# Patient Record
Sex: FEMALE | Race: AMERICAN INDIAN OR ALASKA NATIVE
[De-identification: names, ages, dates, MRNs, and addresses within clinical notes are randomized per-mention and may not be internally consistent; named-entity substitution may affect disease eponyms.]

---

## 2018-09-11 ENCOUNTER — HOSPITAL ENCOUNTER (EMERGENCY)
Dept: HOSPITAL 5 - ED | Age: 83
Discharge: HOME | End: 2018-09-11
Payer: MEDICARE

## 2018-09-11 VITALS — DIASTOLIC BLOOD PRESSURE: 49 MMHG | SYSTOLIC BLOOD PRESSURE: 156 MMHG

## 2018-09-11 DIAGNOSIS — I10: ICD-10-CM

## 2018-09-11 DIAGNOSIS — L29.9: Primary | ICD-10-CM

## 2018-09-11 PROCEDURE — 99282 EMERGENCY DEPT VISIT SF MDM: CPT

## 2018-09-11 NOTE — EMERGENCY DEPARTMENT REPORT
ED Recheck HPI





- General


Chief Complaint: Skin/Abscess/Foreign Body


Stated Complaint: CYST ON ABDOMEN


Time Seen by Provider: 09/11/18 14:27


Source: patient


Mode of arrival: Ambulatory


Limitations: No Limitations





- History of Present Illness


Initial Comments: 





This is a 83-year-old -American female who presents for wound 

reevaluation from I&D 3 days ago.  Patient states feel much better.  There is 

moderate amount of drainage.  Patient's family states they are changing 

dressing twice a day.  Patient states she has a follow-up appointment with her 

primary care provider next Thursday.  Patient states there is itching to the 

area and she has taken Benadryl with no improvement of symptoms.  She is also 

requesting more pain ibuprofen for pain management.  Denies warmth, redness, 

numbness or tingling, and odor.


MD Complaint: wound re-check


Onset/Timing: 3


-: days(s)


Initial Visit For: abscess


Returns Today for: wound recheck


Symptoms Since Prior Visit: no new symptoms


Context: planned re-check


Associated Symptoms: none


Treatments Prior to Arrival: dressings, Given Antibiotics on





- Related Data


 Previous Rx's











 Medication  Instructions  Recorded  Last Taken  Type


 


Ibuprofen [Motrin 800 MG tab] 800 mg PO Q8HR PRN #15 tablet 09/08/18 Unknown Rx


 


Sulfamethoxazole/Trimethoprim 1 each PO BID #20 tablet 09/08/18 Unknown Rx





[Bactrim DS TAB]    


 


Ibuprofen [Motrin 800 MG tab] 800 mg PO Q8HR PRN #8 tablet 09/11/18 Unknown Rx


 


hydrOXYzine PAMOATE [Vistaril] 25 mg PO Q6HR PRN #12 capsule 09/11/18 Unknown Rx











 Allergies











Allergy/AdvReac Type Severity Reaction Status Date / Time


 


Penicillins Allergy  Rash Verified 09/08/18 12:55














ED Review of Systems


ROS: 


Stated complaint: CYST ON ABDOMEN


Other details as noted in HPI





Constitutional: denies: chills, fever


Respiratory: denies: cough, shortness of breath, wheezing


Cardiovascular: denies: chest pain, palpitations


Gastrointestinal: denies: abdominal pain, nausea, diarrhea


Skin: lesions (wound with packing to pubic region).  denies: rash


Neurological: denies: headache, weakness, paresthesias


Psychiatric: denies: anxiety, depression





ED Past Medical Hx





- Past Medical History


Previous Medical History?: Yes


Hx Hypertension: Yes


Additional medical history: A FIB





- Surgical History


Past Surgical History?: Yes


Additional Surgical History: LEG SURGERY CAUTERY TO VESSELS FOR A FIB





- Social History


Smoking Status: Never Smoker


Substance Use Type: None





- Medications


Home Medications: 


 Home Medications











 Medication  Instructions  Recorded  Confirmed  Last Taken  Type


 


Ibuprofen [Motrin 800 MG tab] 800 mg PO Q8HR PRN #15 tablet 09/08/18  Unknown Rx


 


Sulfamethoxazole/Trimethoprim 1 each PO BID #20 tablet 09/08/18  Unknown Rx





[Bactrim DS TAB]     


 


Ibuprofen [Motrin 800 MG tab] 800 mg PO Q8HR PRN #8 tablet 09/11/18  Unknown Rx


 


hydrOXYzine PAMOATE [Vistaril] 25 mg PO Q6HR PRN #12 capsule 09/11/18  Unknown 

Rx














ED Physical Exam





- General


Limitations: No Limitations


General appearance: alert, in no apparent distress





- Respiratory


Respiratory exam: Present: normal lung sounds bilaterally.  Absent: respiratory 

distress





- Cardiovascular


Cardiovascular Exam: Present: regular rate, normal rhythm.  Absent: systolic 

murmur, diastolic murmur, rubs, gallop





- GI/Abdominal


GI/Abdominal exam: Present: soft, normal bowel sounds





- Neurological Exam


Neurological exam: Present: alert, oriented X3





- Psychiatric


Psychiatric exam: Present: normal affect, normal mood





- Skin


Skin exam: Present: warm, dry, normal color.  Absent: intact (1 cm wound with 

packing to right side of mons pubis, malodorous discharge, nontender), rash





ED Course


 Vital Signs











  09/11/18





  13:30


 


Temperature 98.7 F


 


Pulse Rate 56 L


 


Respiratory 18





Rate 


 


Blood Pressure 156/49


 


O2 Sat by Pulse 99





Oximetry 














ED Recheck MDM





- Differential Diagnosis


Wound Recheck





- Medical Decision Making





Patient is stable and examined by me.  Patient return for packing removal and 

reevaluation of wound.


There is moderate drainage, packing removed with worse and repacked with 

iodoform packing.


Educated patient and family on follow up plan to have packing removed and wound 

reassessed in 2-3 days. 


Patient given antibiotics and initial visit and advised to completely 

prescribed.


Start Vistaril for pruritus and ibuprofen for pain.


Patient agrees to ED plan of care. 


Discharged home and follow up with PCP in 2-3 days.


Critical care attestation.: 


If time is entered above; I have spent that time in minutes in the direct care 

of this critically ill patient, excluding procedure time.








ED Disposition


Clinical Impression: 


 Abscess packing removal





Disposition: DC-01 TO HOME OR SELFCARE


Is pt being admited?: No


Does the pt Need Aspirin: No


Condition: Stable


Instructions:  Acute Wound Care (ED)


Additional Instructions: 


Keep packing in place for 2-3 days. 


Return to ER or f/u with PCP to have packing removed and wound reassessed.


Complete full round of bactrim DS antibiotic as prescribed on initial visit.


Follow up with PCP or ER in 2-3 days.


Return to ER if foul smelling discharge, swelling, or severe pain to wound.


Prescriptions: 


hydrOXYzine PAMOATE [Vistaril] 25 mg PO Q6HR PRN #12 capsule


 PRN Reason: Itching


Ibuprofen [Motrin 800 MG tab] 800 mg PO Q8HR PRN #8 tablet


 PRN Reason: Pain , Severe (7-10)


Referrals: 


JESIKA ANDREA MD [Referring] - 3-5 Days


Time of Disposition: 15:06


Print Language: ENGLISH

## 2018-09-14 ENCOUNTER — HOSPITAL ENCOUNTER (INPATIENT)
Dept: HOSPITAL 5 - ED | Age: 83
LOS: 3 days | Discharge: HOME HEALTH | DRG: 871 | End: 2018-09-17
Attending: INTERNAL MEDICINE | Admitting: INTERNAL MEDICINE
Payer: MEDICARE

## 2018-09-14 DIAGNOSIS — E87.5: ICD-10-CM

## 2018-09-14 DIAGNOSIS — N18.9: ICD-10-CM

## 2018-09-14 DIAGNOSIS — E87.1: ICD-10-CM

## 2018-09-14 DIAGNOSIS — A41.9: Primary | ICD-10-CM

## 2018-09-14 DIAGNOSIS — I50.43: ICD-10-CM

## 2018-09-14 DIAGNOSIS — L02.214: ICD-10-CM

## 2018-09-14 DIAGNOSIS — E11.22: ICD-10-CM

## 2018-09-14 DIAGNOSIS — N39.0: ICD-10-CM

## 2018-09-14 DIAGNOSIS — I48.91: ICD-10-CM

## 2018-09-14 DIAGNOSIS — Z88.0: ICD-10-CM

## 2018-09-14 DIAGNOSIS — N17.9: ICD-10-CM

## 2018-09-14 DIAGNOSIS — E86.9: ICD-10-CM

## 2018-09-14 DIAGNOSIS — Z79.899: ICD-10-CM

## 2018-09-14 DIAGNOSIS — M10.9: ICD-10-CM

## 2018-09-14 DIAGNOSIS — I13.0: ICD-10-CM

## 2018-09-14 LAB
BASOPHILS # (AUTO): 0 K/MM3 (ref 0–0.1)
BASOPHILS NFR BLD AUTO: 0.3 % (ref 0–1.8)
BILIRUB UR QL STRIP: (no result)
BLOOD UR QL VISUAL: (no result)
BUN SERPL-MCNC: 21 MG/DL (ref 7–17)
BUN/CREAT SERPL: 15 %
CALCIUM SERPL-MCNC: 10.1 MG/DL (ref 8.4–10.2)
EOSINOPHIL # BLD AUTO: 0.3 K/MM3 (ref 0–0.4)
EOSINOPHIL NFR BLD AUTO: 2.3 % (ref 0–4.3)
HCT VFR BLD CALC: 32.3 % (ref 30.3–42.9)
HEMOLYSIS INDEX: 0
HGB BLD-MCNC: 10.7 GM/DL (ref 10.1–14.3)
LYMPHOCYTES # BLD AUTO: 1.5 K/MM3 (ref 1.2–5.4)
LYMPHOCYTES NFR BLD AUTO: 13.4 % (ref 13.4–35)
MCH RBC QN AUTO: 32 PG (ref 28–32)
MCHC RBC AUTO-ENTMCNC: 33 % (ref 30–34)
MCV RBC AUTO: 96 FL (ref 79–97)
MONOCYTES # (AUTO): 0.8 K/MM3 (ref 0–0.8)
MONOCYTES % (AUTO): 6.7 % (ref 0–7.3)
MUCOUS THREADS #/AREA URNS HPF: (no result) /HPF
PH UR STRIP: 5 [PH] (ref 5–7)
PLATELET # BLD: 403 K/MM3 (ref 140–440)
RBC # BLD AUTO: 3.37 M/MM3 (ref 3.65–5.03)
RBC #/AREA URNS HPF: 3 /HPF (ref 0–6)
UROBILINOGEN UR-MCNC: < 2 MG/DL (ref ?–2)
WBC #/AREA URNS HPF: 7 /HPF (ref 0–6)

## 2018-09-14 PROCEDURE — 82550 ASSAY OF CK (CPK): CPT

## 2018-09-14 PROCEDURE — A9558 XE133 XENON 10MCI: HCPCS

## 2018-09-14 PROCEDURE — 82140 ASSAY OF AMMONIA: CPT

## 2018-09-14 PROCEDURE — 96374 THER/PROPH/DIAG INJ IV PUSH: CPT

## 2018-09-14 PROCEDURE — 83036 HEMOGLOBIN GLYCOSYLATED A1C: CPT

## 2018-09-14 PROCEDURE — 93306 TTE W/DOPPLER COMPLETE: CPT

## 2018-09-14 PROCEDURE — 82962 GLUCOSE BLOOD TEST: CPT

## 2018-09-14 PROCEDURE — 71046 X-RAY EXAM CHEST 2 VIEWS: CPT

## 2018-09-14 PROCEDURE — 78582 LUNG VENTILAT&PERFUS IMAGING: CPT

## 2018-09-14 PROCEDURE — 36415 COLL VENOUS BLD VENIPUNCTURE: CPT

## 2018-09-14 PROCEDURE — 84484 ASSAY OF TROPONIN QUANT: CPT

## 2018-09-14 PROCEDURE — 93005 ELECTROCARDIOGRAM TRACING: CPT

## 2018-09-14 PROCEDURE — 80048 BASIC METABOLIC PNL TOTAL CA: CPT

## 2018-09-14 PROCEDURE — 93010 ELECTROCARDIOGRAM REPORT: CPT

## 2018-09-14 PROCEDURE — 81001 URINALYSIS AUTO W/SCOPE: CPT

## 2018-09-14 PROCEDURE — 87040 BLOOD CULTURE FOR BACTERIA: CPT

## 2018-09-14 PROCEDURE — 85379 FIBRIN DEGRADATION QUANT: CPT

## 2018-09-14 PROCEDURE — A9540 TC99M MAA: HCPCS

## 2018-09-14 PROCEDURE — 85025 COMPLETE CBC W/AUTO DIFF WBC: CPT

## 2018-09-14 PROCEDURE — 96361 HYDRATE IV INFUSION ADD-ON: CPT

## 2018-09-14 PROCEDURE — 82553 CREATINE MB FRACTION: CPT

## 2018-09-14 PROCEDURE — 80202 ASSAY OF VANCOMYCIN: CPT

## 2018-09-14 PROCEDURE — 80053 COMPREHEN METABOLIC PANEL: CPT

## 2018-09-14 PROCEDURE — 83880 ASSAY OF NATRIURETIC PEPTIDE: CPT

## 2018-09-14 RX ADMIN — PIPERACILLIN SODIUM AND TAZOBACTAM SODIUM SCH MLS/HR: 3; .375 INJECTION, POWDER, LYOPHILIZED, FOR SOLUTION INTRAVENOUS at 22:05

## 2018-09-14 RX ADMIN — HYDROXYZINE PAMOATE PRN MG: 25 CAPSULE ORAL at 23:58

## 2018-09-14 RX ADMIN — Medication SCH: at 22:50

## 2018-09-14 RX ADMIN — METOPROLOL TARTRATE SCH MG: 50 TABLET, FILM COATED ORAL at 20:13

## 2018-09-14 RX ADMIN — ALLOPURINOL SCH MG: 100 TABLET ORAL at 22:59

## 2018-09-14 RX ADMIN — FAMOTIDINE SCH MG: 20 TABLET ORAL at 22:59

## 2018-09-14 NOTE — XRAY REPORT
CHEST 2 VIEWS



INDICATION: Shortness of breath.



COMPARISON: None similar at this institution.



FINDINGS: PA and lateral chest radiographs suggest top normal heart 

size.  Normal mediastinal and hilar contours.  No effusions or CHF.  

Demineralized bones with mild spinal degenerative spurring. 



CONCLUSION: No acute chest process, as described.



Thank you for the opportunity to participate in this patient's care.

## 2018-09-14 NOTE — EMERGENCY DEPARTMENT REPORT
ED Shortness of Breath HPI





- General


Chief Complaint: Dyspnea/Respdistress


Stated Complaint: REPACK PACKING


Time Seen by Provider: 09/14/18 13:33


Source: patient, family


Mode of arrival: Wheelchair


Limitations: No Limitations





- History of Present Illness


Initial Comments: 


Patient is an 83-year-old female presents to emergency room that presents 

emergency room for dizziness and shortness of breath that started this morning.

  Patient states 4 days ago she had an I&D done on her lower abdomen.  Patient 

states that the pain around the abdomen is increasing and it has become more 

hard and more discharge.  Patient states that the site is worsening.  Patient 

has come here to have the packing removed and changed.  Patient denies fever 

chills.  Patient denies bodyaches.  Patient states she is just not feeling 

well.  Patient states she feels weak.





MD Complaint: shortness of breath


-: Sudden


Severity: severe


Consistency: constant


Improves With: rest


Worsens With: exertion, movement


Context: recent illness


Associated Symptoms: denies other symptoms


Treatments Prior to Arrival: none





- Related Data


Home Oxygen Therapy: No


 Home Medications











 Medication  Instructions  Recorded  Confirmed  Last Taken


 


Allopurinol [Zyloprim] 100 mg PO QDAY 09/14/18 09/14/18 09/14/18


 


Metoprolol Tartrate 50 mg PO Q12H 09/14/18 09/14/18 09/14/18








 Previous Rx's











 Medication  Instructions  Recorded  Last Taken  Type


 


Sulfamethoxazole/Trimethoprim 1 each PO BID #20 tablet 09/08/18 Unknown Rx





[Bactrim DS TAB]    


 


Ibuprofen [Motrin 800 MG tab] 800 mg PO Q8HR PRN #8 tablet 09/11/18 Unknown Rx


 


hydrOXYzine PAMOATE [Vistaril] 25 mg PO Q6HR PRN #12 capsule 09/11/18 Unknown Rx











 Allergies











Allergy/AdvReac Type Severity Reaction Status Date / Time


 


Penicillins Allergy  Rash Verified 09/08/18 12:55














ED Review of Systems


ROS: 


Stated complaint: REPACK PACKING


Other details as noted in HPI





Constitutional: malaise, weakness.  denies: chills, fever


Eyes: denies: eye pain, eye discharge, vision change


ENT: denies: ear pain, throat pain


Respiratory: shortness of breath.  denies: cough, wheezing


Cardiovascular: denies: chest pain, palpitations


Endocrine: no symptoms reported


Gastrointestinal: denies: abdominal pain, nausea, diarrhea


Genitourinary: denies: urgency, dysuria, discharge


Musculoskeletal: denies: back pain, joint swelling, arthralgia


Skin: denies: rash, lesions


Neurological: weakness, vertigo.  denies: headache, paresthesias


Psychiatric: denies: anxiety, depression


Hematological/Lymphatic: denies: easy bleeding, easy bruising





ED Past Medical Hx





- Past Medical History


Previous Medical History?: Yes


Hx Hypertension: Yes


Additional medical history: A FIB





- Surgical History


Past Surgical History?: Yes


Additional Surgical History: LEG SURGERY CAUTERY TO VESSELS FOR A FIB





- Family History


Family history: no significant





- Social History


Smoking Status: Never Smoker


Substance Use Type: None





- Medications


Home Medications: 


 Home Medications











 Medication  Instructions  Recorded  Confirmed  Last Taken  Type


 


Sulfamethoxazole/Trimethoprim 1 each PO BID #20 tablet 09/08/18 09/14/18 

Unknown Rx





[Bactrim DS TAB]     


 


Ibuprofen [Motrin 800 MG tab] 800 mg PO Q8HR PRN #8 tablet 09/11/18 09/14/18 

Unknown Rx


 


hydrOXYzine PAMOATE [Vistaril] 25 mg PO Q6HR PRN #12 capsule 09/11/18 09/14/18 

Unknown Rx


 


Allopurinol [Zyloprim] 100 mg PO QDAY 09/14/18 09/14/18 09/14/18 History


 


Metoprolol Tartrate 50 mg PO Q12H 09/14/18 09/14/18 09/14/18 History














ED Physical Exam





- General


Limitations: No Limitations


General appearance: alert, in no apparent distress





- Head


Head exam: Present: atraumatic, normocephalic





- Eye


Eye exam: Present: normal appearance





- ENT


ENT exam: Present: mucous membranes moist





- Neck


Neck exam: Present: normal inspection





- Respiratory


Respiratory exam: Present: normal lung sounds bilaterally.  Absent: respiratory 

distress





- Cardiovascular


Cardiovascular Exam: Present: regular rate, normal rhythm.  Absent: systolic 

murmur, diastolic murmur, rubs, gallop





- GI/Abdominal


GI/Abdominal exam: Present: soft, normal bowel sounds





- Extremities Exam


Extremities exam: Present: normal inspection





- Back Exam


Back exam: Present: normal inspection





- Neurological Exam


Neurological exam: Present: alert, oriented X3





- Psychiatric


Psychiatric exam: Present: normal affect, normal mood





- Skin


Skin exam: Present: warm, dry, normal color, erythema (right lower abdomen I&D 

site hard and red and indurated with a purulent discharge).  Absent: rash





ED Course


 Vital Signs











  09/14/18 09/14/18 09/14/18





  13:03 14:07 14:15


 


Temperature 98.9 F  


 


Pulse Rate 50 L  52 L


 


Respiratory 14 14 17





Rate   


 


Blood Pressure 184/67  202/79


 


O2 Sat by Pulse 100  100





Oximetry   














  09/14/18 09/14/18 09/14/18





  14:30 14:46 15:38


 


Temperature   


 


Pulse Rate 51 L 53 L 60


 


Respiratory 18 12 13





Rate   


 


Blood Pressure  183/66 183/66


 


O2 Sat by Pulse 99 100 96





Oximetry   














  09/14/18 09/14/18 09/14/18





  15:46 16:00 16:15


 


Temperature   


 


Pulse Rate 55 L 54 L 54 L


 


Respiratory 8 L 13 19





Rate   


 


Blood Pressure 191/74 174/69 171/65


 


O2 Sat by Pulse 100 100 98





Oximetry   














  09/14/18 09/14/18 09/14/18





  16:30 16:46 17:00


 


Temperature   


 


Pulse Rate 54 L 56 L 56 L


 


Respiratory 12 13 13





Rate   


 


Blood Pressure 168/61 145/71 182/60


 


O2 Sat by Pulse 99 99 100





Oximetry   














- Reevaluation(s)


Reevaluation #1: 


 Discussed all results with patient and family.  Patient agrees to plan of care 

and admission.  Patient admitted to the hospitalist service.  We'll consult 

hospitalist. 


09/14/18 15:55











- Consultations


Consultation #1: 


 Hospitalist consulted for admission.  Hospitalist to admit patient.  Dr. Ramos 

to assume care


09/14/18 15:56








ED Medical Decision Making





- Lab Data


Result diagrams: 


 09/14/18 13:23





 09/14/18 13:23





- EKG Data


-: EKG Interpreted by Me


EKG shows normal: sinus rhythm, axis, intervals, QRS complexes, ST-T waves


Rate: bradycardia





- Radiology Data


Radiology results: report reviewed, image reviewed








 CHEST 2 VIEWS 





 INDICATION: Shortness of breath. 





 COMPARISON: None similar at this institution. 





 FINDINGS: PA and lateral chest radiographs suggest top normal heart 


 size. Normal mediastinal and hilar contours. No effusions or CHF. 


 Demineralized bones with mild spinal degenerative spurring. 





 CONCLUSION: No acute chest process, as described. 





 Thank you for the opportunity to participate in this patient's care. 





 Transcribed By: RS 


 Dictated By: JOSE MANUEL WINN MD 


 Electronically Authenticated By: JOSE MANUEL WINN MD 


 Signed Date/Time: 09/14/18 1402 























VENTILATION PERFUSION SCAN 





 INDICATION: Shortness of breath. Elevated creatinine. 





 COMPARISON: None similar. 





 FINDINGS: VQ scan performed in anterior, posterior, lateral and oblique 


 projections. 5 mCi of technetium 99m MAA was used for the perfusion 


 assessment while 15 millicuries of Xenon 133 was utilized for the 


 ventilation portion of the study. 





 Ventilation images demonstrate fairly homogenous radiotracer 


 distribution throughout both lungs, though slight air trapping may be 


 present. 





 The perfusion matches the ventilation without large lobar or definite 


 segmental defects. 





 Available chest radiograph from earlier today demonstrates no acute 


 chest process. 





 CONCLUSION: Low probability exam for pulmonary embolism. 





 Thank you for the opportunity to participate in this patient's care. 





 Transcribed By: RS 


 Dictated By: JOSE MANUEL WINN MD 


 Electronically Authenticated By: JOSE MANUEL WINN MD 


 Signed Date/Time: 09/14/18 1528 























- Medical Decision Making


Is a 83-year-old female presents to [facility] ([facility] with shortness of breath.  Patient 

found to have an elevated BNP and d-dimer.  Nuclear scan was negative chest x-

rays negative.  Patient also had an elevated white count and a UTI.  Patient 

was admitted to the hospitalist service for further evaluation and treatment.








- Differential Diagnosis


sepsis. sob. pna. uti, abscess


Critical Care Time: Yes


Critical care attestation.: 


If time is entered above; I have spent that time in minutes in the direct care 

of this critically ill patient, excluding procedure time.





Critical Care Time: 


35 minutes for cc time








ED Disposition


Clinical Impression: 


 Abscess, SOB (shortness of breath) on exertion, SOB (shortness of breath), 

Dizziness, New onset of congestive heart failure, Elevated d-dimer, Elevated 

brain natriuretic peptide (BNP) level, Hyperkalemia, Acute kidney insufficiency





UTI (urinary tract infection)


Qualifiers:


 Urinary tract infection type: acute cystitis Hematuria presence: with 

hematuria Qualified Code(s): N30.01 - Acute cystitis with hematuria





Disposition: DC-09 OP ADMIT IP TO THIS HOSP


Is pt being admited?: Yes


Does the pt Need Aspirin: No


Condition: Critical


Time of Disposition: 15:55

## 2018-09-14 NOTE — NUCLEAR MEDICINE REPORT
VENTILATION PERFUSION SCAN



INDICATION: Shortness of breath.  Elevated creatinine.



COMPARISON: None similar.



FINDINGS: VQ scan performed in anterior, posterior, lateral and oblique 

projections. 5 mCi of technetium 99m MAA was used for the perfusion 

assessment while 15 millicuries of Xenon 133 was utilized for the 

ventilation portion of the study.



Ventilation images demonstrate fairly homogenous radiotracer 

distribution throughout both lungs, though slight air trapping may be 

present.



The perfusion matches the ventilation without large lobar or definite 

segmental defects. 



Available chest radiograph from earlier today demonstrates no acute 

chest process.



CONCLUSION: Low probability exam for pulmonary embolism.



Thank you for the opportunity to participate in this patient's care.

## 2018-09-15 LAB
ALBUMIN SERPL-MCNC: 3.2 G/DL (ref 3.9–5)
ALT SERPL-CCNC: 10 UNITS/L (ref 7–56)
BASOPHILS # (AUTO): 0 K/MM3 (ref 0–0.1)
BASOPHILS NFR BLD AUTO: 0.2 % (ref 0–1.8)
BUN SERPL-MCNC: 18 MG/DL (ref 7–17)
BUN/CREAT SERPL: 14 %
CALCIUM SERPL-MCNC: 10 MG/DL (ref 8.4–10.2)
EOSINOPHIL # BLD AUTO: 0.2 K/MM3 (ref 0–0.4)
EOSINOPHIL NFR BLD AUTO: 2.2 % (ref 0–4.3)
HCT VFR BLD CALC: 29.2 % (ref 30.3–42.9)
HEMOLYSIS INDEX: 7
HGB BLD-MCNC: 9.9 GM/DL (ref 10.1–14.3)
LYMPHOCYTES # BLD AUTO: 2.1 K/MM3 (ref 1.2–5.4)
LYMPHOCYTES NFR BLD AUTO: 19.7 % (ref 13.4–35)
MCH RBC QN AUTO: 32 PG (ref 28–32)
MCHC RBC AUTO-ENTMCNC: 34 % (ref 30–34)
MCV RBC AUTO: 94 FL (ref 79–97)
MONOCYTES # (AUTO): 0.8 K/MM3 (ref 0–0.8)
MONOCYTES % (AUTO): 7.4 % (ref 0–7.3)
PLATELET # BLD: 362 K/MM3 (ref 140–440)
RBC # BLD AUTO: 3.11 M/MM3 (ref 3.65–5.03)

## 2018-09-15 RX ADMIN — VANCOMYCIN HYDROCHLORIDE SCH MLS/HR: 5 INJECTION, POWDER, LYOPHILIZED, FOR SOLUTION INTRAVENOUS at 12:14

## 2018-09-15 RX ADMIN — HYDROXYZINE PAMOATE PRN MG: 25 CAPSULE ORAL at 06:30

## 2018-09-15 RX ADMIN — ALLOPURINOL SCH MG: 100 TABLET ORAL at 11:23

## 2018-09-15 RX ADMIN — FAMOTIDINE SCH MG: 20 TABLET ORAL at 09:18

## 2018-09-15 RX ADMIN — HYDRALAZINE HYDROCHLORIDE PRN MG: 20 INJECTION INTRAMUSCULAR; INTRAVENOUS at 02:40

## 2018-09-15 RX ADMIN — Medication SCH ML: at 22:47

## 2018-09-15 RX ADMIN — AMLODIPINE BESYLATE SCH: 10 TABLET ORAL at 11:24

## 2018-09-15 RX ADMIN — HYDRALAZINE HYDROCHLORIDE PRN MG: 20 INJECTION INTRAMUSCULAR; INTRAVENOUS at 19:58

## 2018-09-15 RX ADMIN — GLIMEPIRIDE SCH MG: 2 TABLET ORAL at 09:18

## 2018-09-15 RX ADMIN — METOPROLOL TARTRATE SCH: 50 TABLET, FILM COATED ORAL at 08:31

## 2018-09-15 RX ADMIN — OXYCODONE AND ACETAMINOPHEN PRN TAB: 5; 325 TABLET ORAL at 04:40

## 2018-09-15 RX ADMIN — PIPERACILLIN SODIUM AND TAZOBACTAM SODIUM SCH: 3; .375 INJECTION, POWDER, LYOPHILIZED, FOR SOLUTION INTRAVENOUS at 00:04

## 2018-09-15 RX ADMIN — Medication SCH ML: at 11:24

## 2018-09-15 RX ADMIN — MEROPENEM SCH MLS/HR: 500 INJECTION, POWDER, FOR SOLUTION INTRAVENOUS at 22:45

## 2018-09-15 RX ADMIN — OXYCODONE AND ACETAMINOPHEN PRN TAB: 5; 325 TABLET ORAL at 19:58

## 2018-09-15 RX ADMIN — AMLODIPINE BESYLATE SCH: 10 TABLET ORAL at 04:38

## 2018-09-15 RX ADMIN — METOPROLOL TARTRATE SCH MG: 50 TABLET, FILM COATED ORAL at 22:46

## 2018-09-15 RX ADMIN — ENOXAPARIN SODIUM SCH MG: 100 INJECTION SUBCUTANEOUS at 09:18

## 2018-09-15 RX ADMIN — PIPERACILLIN SODIUM AND TAZOBACTAM SODIUM SCH MLS/HR: 3; .375 INJECTION, POWDER, LYOPHILIZED, FOR SOLUTION INTRAVENOUS at 05:16

## 2018-09-15 NOTE — CONSULTATION
History of Present Illness


Consult date: 09/15/18


Reason for consult: wound care


Requesting physician: ROSA LOREDO


Chief complaint: 





open wound





- History of present illness


History of present illness: 





84yo F with recent groin infection has been coming to the ED every few days 

results for care of a "boil" that was lanced in the ED last Saturday. When she 

came yesterday for a dressing change, she reported that she was feeling very 

weak. Pt was admitted for CHF exacerbation. We are being asked to see the 

patient for wound care.  Patient denies any problems with the wound.  She feels 

as though the wound is getting better.  Denies any fevers, chills, nausea, 

vomiting.





Past History


Past Medical History: atrial fib


Past Surgical History: Other (a fib ablation)


Social history: denies: smoking, alcohol abuse


Family history: no significant family history





Medications and Allergies


 Allergies











Allergy/AdvReac Type Severity Reaction Status Date / Time


 


Penicillins Allergy  Rash Verified 09/08/18 12:55











 Home Medications











 Medication  Instructions  Recorded  Confirmed  Last Taken  Type


 


Sulfamethoxazole/Trimethoprim 1 each PO BID #20 tablet 09/08/18 09/14/18 

Unknown Rx





[Bactrim DS TAB]     


 


Ibuprofen [Motrin 800 MG tab] 800 mg PO Q8HR PRN #8 tablet 09/11/18 09/14/18 

Unknown Rx


 


hydrOXYzine PAMOATE [Vistaril] 25 mg PO Q6HR PRN #12 capsule 09/11/18 09/14/18 

Unknown Rx


 


Allopurinol [Zyloprim] 100 mg PO QDAY 09/14/18 09/14/18 09/14/18 History


 


Metoprolol Tartrate 50 mg PO Q12H 09/14/18 09/14/18 09/14/18 History











Active Meds: 


Active Medications





Acetaminophen (Tylenol)  650 mg PO Q4H PRN


   PRN Reason: Pain MILD(1-3)/Fever >100.5/HA


Allopurinol (Zyloprim)  100 mg PO QDAY [facility]


   Last Admin: 09/15/18 11:23 Dose:  100 mg


Amlodipine Besylate (Norvasc)  10 mg PO QDAY [facility]


   Last Admin: 09/15/18 11:24 Dose:  Not Given


Enoxaparin Sodium (Lovenox)  30 mg SUB-Q QDAY [facility]


   Last Admin: 09/15/18 09:18 Dose:  30 mg


Famotidine (Pepcid)  20 mg PO DAILY [facility]


   Last Admin: 09/15/18 09:18 Dose:  20 mg


Glimepiride (Amaryl)  2 mg PO QDDIAB [facility]


   Last Admin: 09/15/18 09:18 Dose:  2 mg


Hydralazine HCl (Apresoline)  20 mg IV Q4H PRN


   PRN Reason: Blood Pressure


   Last Admin: 09/15/18 02:40 Dose:  20 mg


Hydroxyzine Pamoate (Vistaril)  25 mg PO Q6HR PRN


   PRN Reason: Itching


   Last Admin: 09/15/18 06:30 Dose:  25 mg


Meropenem (Merrem/Ns 500 Mg/50 Ml)  500 mg in 50 mls @ 100 mls/hr IV Q12HR [facility]


Vancomycin HCl 1,250 mg/ (Sodium Chloride)  275 mls @ 166.667 mls/hr IV Q24HR 

[facility]


   Last Admin: 09/15/18 12:14 Dose:  166.667 mls/hr


Ibuprofen (Motrin)  800 mg PO Q8HR PRN


   PRN Reason: Pain , Severe (7-10)


   Last Admin: 09/14/18 23:58 Dose:  800 mg


Insulin Human Lispro (Humalog)  0 unit SUB-Q ACHS [facility]; Protocol


   Last Admin: 09/15/18 12:14 Dose:  2 unit


Metoprolol Tartrate (Lopressor)  50 mg PO Q12H [facility]


   Last Admin: 09/15/18 08:31 Dose:  Not Given


Morphine Sulfate (Morphine)  2 mg IV Q4H PRN


   PRN Reason: Pain, Moderate (4-6)


   Last Admin: 09/15/18 13:37 Dose:  2 mg


Ondansetron HCl (Zofran)  4 mg IV Q8H PRN


   PRN Reason: Nausea And Vomiting


   Last Admin: 09/15/18 13:37 Dose:  4 mg


Oxycodone/Acetaminophen (Percocet 5/325)  1 tab PO Q6H PRN


   PRN Reason: Pain, Moderate (4-6)


   Last Admin: 09/15/18 04:40 Dose:  1 tab


Sodium Chloride (Sodium Chloride Flush Syringe 10 Ml)  10 ml IV BID [facility]


   Last Admin: 09/15/18 11:24 Dose:  10 ml


Sodium Chloride (Sodium Chloride Flush Syringe 10 Ml)  10 ml IV PRN PRN


   PRN Reason: LINE FLUSH











Review of Systems





- Constitutional


weakness, no fever, no chills, no sweats, no night sweats, no poor appetite





- Cardiovascular


no chest pain





- Respiratory


dyspnea on exertion (- better now), no cough





- Gastrointestinal


no abdominal pain, no nausea, no vomiting





- Genitourinary


Genitourinary: no dysuria





- Integumentary


wounds, boils





Exam


 Vital Signs











Temp Pulse Resp BP Pulse Ox


 


 98.9 F   50 L  14   184/67   100 


 


 09/14/18 13:03  09/14/18 13:03  09/14/18 13:03  09/14/18 13:03  09/14/18 13:03














- General physical appearance


Positive: no distress, no pain, other (pleasant elderly lady)





- Eyes


Positive: normal occular movement





- Respiratory


Positive: normal expansion, normal respiratory effort





- Abdomen


Abdomen: Present: soft.  Absent: tender, distended





- Integumentary


other (~7cm wound in right perineal/groin region. Wound is fairly clean. No 

erythema. no signs of active infection. No purulent drainage. No packing 

currently in place. )





- Neurologic


Neurologic: alert and oriented to time, place and person, motor strength and 

sensation are grossly intact





- Psychiatric


Psychiatric: appropriate mood/affect, intact judgment & insight





Results





- Labs





 09/15/18 04:16





 09/15/18 04:16


 Abnormal lab results











  09/14/18 09/14/18 09/14/18 Range/Units





  13:23 13:53 13:53 


 


RBC     (3.65-5.03)  M/mm3


 


Hgb     (10.1-14.3)  gm/dl


 


Hct     (30.3-42.9)  %


 


Mono % (Auto)     (0.0-7.3)  %


 


Seg Neutrophils %     (40.0-70.0)  %


 


D-Dimer   1680.08 H   (0-234)  ng/mlDDU


 


Sodium     (137-145)  mmol/L


 


BUN     (7-17)  mg/dL


 


Creatinine     (0.7-1.2)  mg/dL


 


Glucose     ()  mg/dL


 


POC Glucose     ()  


 


Hemoglobin A1c  9.0 H    (4-6)  %


 


CK-MB (CK-2) Rel Index    5.0 H  (0-4)  


 


Total Protein     (6.3-8.2)  g/dL


 


Albumin     (3.9-5)  g/dL


 


Vancomycin Trough     (5.0-20.0)  ug/mL














  09/15/18 09/15/18 09/15/18 Range/Units





  04:16 04:16 06:37 


 


RBC  3.11 L    (3.65-5.03)  M/mm3


 


Hgb  9.9 L    (10.1-14.3)  gm/dl


 


Hct  29.2 L    (30.3-42.9)  %


 


Mono % (Auto)  7.4 H    (0.0-7.3)  %


 


Seg Neutrophils %  70.5 H    (40.0-70.0)  %


 


D-Dimer     (0-234)  ng/mlDDU


 


Sodium   134 L   (137-145)  mmol/L


 


BUN   18 H   (7-17)  mg/dL


 


Creatinine   1.3 H   (0.7-1.2)  mg/dL


 


Glucose   192 H   ()  mg/dL


 


POC Glucose    176 H  ()  


 


Hemoglobin A1c     (4-6)  %


 


CK-MB (CK-2) Rel Index     (0-4)  


 


Total Protein   6.2 L   (6.3-8.2)  g/dL


 


Albumin   3.2 L   (3.9-5)  g/dL


 


Vancomycin Trough     (5.0-20.0)  ug/mL














  09/15/18 09/15/18 Range/Units





  11:44 13:00 


 


RBC    (3.65-5.03)  M/mm3


 


Hgb    (10.1-14.3)  gm/dl


 


Hct    (30.3-42.9)  %


 


Mono % (Auto)    (0.0-7.3)  %


 


Seg Neutrophils %    (40.0-70.0)  %


 


D-Dimer    (0-234)  ng/mlDDU


 


Sodium    (137-145)  mmol/L


 


BUN    (7-17)  mg/dL


 


Creatinine    (0.7-1.2)  mg/dL


 


Glucose    ()  mg/dL


 


POC Glucose  192 H   ()  


 


Hemoglobin A1c    (4-6)  %


 


CK-MB (CK-2) Rel Index    (0-4)  


 


Total Protein    (6.3-8.2)  g/dL


 


Albumin    (3.9-5)  g/dL


 


Vancomycin Trough   29.1 H  (5.0-20.0)  ug/mL








 Diabetes panel











  09/14/18 09/15/18 Range/Units





  13:23 04:16 


 


Sodium   134 L  (137-145)  mmol/L


 


Potassium   4.8  (3.6-5.0)  mmol/L


 


Chloride   99.6  ()  mmol/L


 


Carbon Dioxide   22  (22-30)  mmol/L


 


BUN   18 H  (7-17)  mg/dL


 


Creatinine   1.3 H  (0.7-1.2)  mg/dL


 


Glucose   192 H  ()  mg/dL


 


Hemoglobin A1c  9.0 H   (4-6)  %


 


Calcium   10.0  (8.4-10.2)  mg/dL


 


AST   10  (5-40)  units/L


 


ALT   10  (7-56)  units/L


 


Alkaline Phosphatase   49  ()  units/L


 


Total Protein   6.2 L  (6.3-8.2)  g/dL


 


Albumin   3.2 L  (3.9-5)  g/dL








 Calcium panel











  09/15/18 Range/Units





  04:16 


 


Calcium  10.0  (8.4-10.2)  mg/dL


 


Albumin  3.2 L  (3.9-5)  g/dL








 Pituitary panel











  09/15/18 Range/Units





  04:16 


 


Sodium  134 L  (137-145)  mmol/L


 


Potassium  4.8  (3.6-5.0)  mmol/L


 


Chloride  99.6  ()  mmol/L


 


Carbon Dioxide  22  (22-30)  mmol/L


 


BUN  18 H  (7-17)  mg/dL


 


Creatinine  1.3 H  (0.7-1.2)  mg/dL


 


Glucose  192 H  ()  mg/dL


 


Calcium  10.0  (8.4-10.2)  mg/dL








 Adrenal panel











  09/15/18 Range/Units





  04:16 


 


Sodium  134 L  (137-145)  mmol/L


 


Potassium  4.8  (3.6-5.0)  mmol/L


 


Chloride  99.6  ()  mmol/L


 


Carbon Dioxide  22  (22-30)  mmol/L


 


BUN  18 H  (7-17)  mg/dL


 


Creatinine  1.3 H  (0.7-1.2)  mg/dL


 


Glucose  192 H  ()  mg/dL


 


Calcium  10.0  (8.4-10.2)  mg/dL


 


Total Bilirubin  0.50  (0.1-1.2)  mg/dL


 


AST  10  (5-40)  units/L


 


ALT  10  (7-56)  units/L


 


Alkaline Phosphatase  49  ()  units/L


 


Total Protein  6.2 L  (6.3-8.2)  g/dL


 


Albumin  3.2 L  (3.9-5)  g/dL














Assessment and Plan





- Patient Problems


(1) Abscess


Current Visit: Yes   Status: Acute   


Plan to address problem: 


Pt stable. Pt does not need further surgery. Requires only wound care. I 

cleaned and packed the wound with Maxorb. No further change needed until 

Monday. 





Rec:


1) If still here on Monday, have WOCN see patient for wound care instructions 

and to set up patient in [facility]ound [facility]


2) Will ask CC to set up Home health for dressing changes


3) If patient is discharged before Monday, please give contact info for Wound 

Care Clinic. 





Please call with questions. 





time=30min

## 2018-09-15 NOTE — CONSULTATION
History of Present Illness





- Reason for Consult


Consult date: 09/15/18


acute renal failure, chronic renal failure


Requesting physician: ROSA LOREDO





- History of Present Illness





Patient is an 83-year-old female presents to emergency room that presents 

emergency room for dizziness and shortness of breath that started this morning.

  Patient states 4 days ago she had an I&D done on her lower abdomen.  Patient 

states that the pain around the abdomen is increasing and it has become more 

hard and more discharge.  Patient states that the site is worsening.  Patient 

has come here to have the packing removed and changed.  Patient denies fever 

chills.  Patient denies bodyaches.  Patient states she is just not feeling 

well.  Patient states she feels weak.





ROS: 


Stated complaint: REPACK PACKING


Other details as noted in HPI





Constitutional: malaise, weakness.  denies: chills, fever


Eyes: denies: eye pain, eye discharge, vision change


ENT: denies: ear pain, throat pain


Respiratory: shortness of breath.  denies: cough, wheezing


Cardiovascular: denies: chest pain, palpitations


Endocrine: no symptoms reported


Gastrointestinal: denies: abdominal pain, nausea, diarrhea


Genitourinary: denies: urgency, dysuria, discharge


Musculoskeletal: denies: back pain, joint swelling, arthralgia


Skin: denies: rash, lesions


Neurological: weakness, vertigo.  denies: headache, paresthesias


Psychiatric: denies: anxiety, depression


Hematological/Lymphatic: denies: easy bleeding, easy bruising





- Past Medical History


Previous Medical History?: Yes


Hx Hypertension: Yes


Additional medical history: A FIB





- Surgical History


Past Surgical History?: Yes


Additional Surgical History: LEG SURGERY CAUTERY TO VESSELS FOR A FIB





- Family History


Family history: no significant





- Social History


Smoking Status: Never Smoker


Substance Use Type: None











Medications and Allergies


 Allergies











Allergy/AdvReac Type Severity Reaction Status Date / Time


 


Penicillins Allergy  Rash Verified 09/08/18 12:55











 Home Medications











 Medication  Instructions  Recorded  Confirmed  Last Taken  Type


 


Sulfamethoxazole/Trimethoprim 1 each PO BID #20 tablet 09/08/18 09/14/18 

Unknown Rx





[Bactrim DS TAB]     


 


Ibuprofen [Motrin 800 MG tab] 800 mg PO Q8HR PRN #8 tablet 09/11/18 09/14/18 

Unknown Rx


 


hydrOXYzine PAMOATE [Vistaril] 25 mg PO Q6HR PRN #12 capsule 09/11/18 09/14/18 

Unknown Rx


 


Allopurinol [Zyloprim] 100 mg PO QDAY 09/14/18 09/14/18 09/14/18 History


 


Metoprolol Tartrate 50 mg PO Q12H 09/14/18 09/14/18 09/14/18 History











Active Meds: 


Active Medications





Acetaminophen (Tylenol)  650 mg PO Q4H PRN


   PRN Reason: Pain MILD(1-3)/Fever >100.5/HA


Allopurinol (Zyloprim)  100 mg PO QDAY [facility]


   Last Admin: 09/14/18 22:59 Dose:  100 mg


Amlodipine Besylate (Norvasc)  10 mg PO QDAY [facility]


   Last Admin: 09/15/18 04:38 Dose:  Not Given


Enoxaparin Sodium (Lovenox)  30 mg SUB-Q QDAY [facility]


   Last Admin: 09/15/18 09:18 Dose:  30 mg


Famotidine (Pepcid)  20 mg PO DAILY [facility]


   Last Admin: 09/15/18 09:18 Dose:  20 mg


Glimepiride (Amaryl)  2 mg PO QDDIAB [facility]


   Last Admin: 09/15/18 09:18 Dose:  2 mg


Hydralazine HCl (Apresoline)  20 mg IV Q4H PRN


   PRN Reason: Blood Pressure


   Last Admin: 09/15/18 02:40 Dose:  20 mg


Hydroxyzine Pamoate (Vistaril)  25 mg PO Q6HR PRN


   PRN Reason: Itching


   Last Admin: 09/15/18 06:30 Dose:  25 mg


Meropenem (Merrem/Ns 500 Mg/50 Ml)  500 mg in 50 mls @ 100 mls/hr IV Q12HR [facility]


Vancomycin HCl 1,250 mg/ (Sodium Chloride)  275 mls @ 166.667 mls/hr IV Q24HR 

[facility]


Ibuprofen (Motrin)  800 mg PO Q8HR PRN


   PRN Reason: Pain , Severe (7-10)


   Last Admin: 09/14/18 23:58 Dose:  800 mg


Insulin Human Lispro (Humalog)  0 unit SUB-Q ACHS [facility]; Protocol


   Last Admin: 09/15/18 08:31 Dose:  Not Given


Metoprolol Tartrate (Lopressor)  50 mg PO Q12H [facility]


   Last Admin: 09/15/18 08:31 Dose:  Not Given


Morphine Sulfate (Morphine)  2 mg IV Q4H PRN


   PRN Reason: Pain, Moderate (4-6)


Ondansetron HCl (Zofran)  4 mg IV Q8H PRN


   PRN Reason: Nausea And Vomiting


Oxycodone/Acetaminophen (Percocet 5/325)  1 tab PO Q6H PRN


   PRN Reason: Pain, Moderate (4-6)


   Last Admin: 09/15/18 04:40 Dose:  1 tab


Sodium Chloride (Sodium Chloride Flush Syringe 10 Ml)  10 ml IV BID [facility]


   Last Admin: 09/14/18 22:50 Dose:  Not Given


Sodium Chloride (Sodium Chloride Flush Syringe 10 Ml)  10 ml IV PRN PRN


   PRN Reason: LINE FLUSH











Exam





- Vital Signs


Vital signs: 


 Vital Signs











Temp Pulse Resp BP Pulse Ox


 


 98.9 F   50 L  14   184/67   100 


 


 09/14/18 13:03  09/14/18 13:03  09/14/18 13:03  09/14/18 13:03  09/14/18 13:03














- Physical Exam


Narrative exam: 





- General


Limitations: No Limitations


General appearance: alert, in no apparent distress





- Head


Head exam: Present: atraumatic, normocephalic





- Eye


Eye exam: Present: normal appearance





- ENT


ENT exam: Present: mucous membranes moist





- Neck


Neck exam: Present: normal inspection





- Respiratory


Respiratory exam: Present: normal lung sounds bilaterally.  Absent: respiratory 

distress





- Cardiovascular


Cardiovascular Exam: Present: regular rate, normal rhythm.  Absent: systolic 

murmur, diastolic murmur, rubs, gallop





- GI/Abdominal


GI/Abdominal exam: Present: soft, normal bowel sounds





- Extremities Exam


Extremities exam: Present: normal inspection





- Back Exam


Back exam: Present: normal inspection





- Neurological Exam


Neurological exam: Present: alert, oriented X3





- Psychiatric


Psychiatric exam: Present: normal affect, normal mood





- Skin


Skin exam: Present: warm, dry, normal color, erythema (right lower abdomen I&D 

site hard and red and indurated with a purulent discharge).  Absent: rash








Results





- Lab Results





 09/15/18 04:16





 09/15/18 04:16


 Most recent lab results











Calcium  10.0 mg/dL (8.4-10.2)   09/15/18  04:16    














Assessment and Plan





Impression:


* JOSH on ckd


* Volume depletion


* UTI


* abcess, wound infection


* sob








Plan:


* no indication for rrt


* cr is stable today


* iv abx for uti


* strict i/os


* avoid nephrotoxins


* will follow lizeth snown

## 2018-09-15 NOTE — PROGRESS NOTE
Assessment and Plan


Assessment and plan: 


83-year-old female was presented yesterday to the emergency department 

complaining of shortness of breath, increased draining of abscess from the 

groin area.  Patient has incision and drainage 5 days ago








Infected right groin abscess


- Patient was placed on IV meropenem and vancomycin


- ID consulted


- Gen. surgery consulted


JOSH


- Nephrology consulted


- Creatinine stable


New-onset diabetes mellitus


- Elevated hemoglobin A1c


- Sliding-scale insulin, Accu-Chek, adjust insulin as needed


Diastolic CHF, hypertension


- Echo was done and showed grade 2


- We'll manage hypertension


History of gout


- Continue allopurinol


DVT prophylaxis


- Lovenox


Disposition


- Continue inpatient care





History


Interval history: 





Patient was seen and about this morning, patient denied fever, chills, 

shortness of breath is getting better.





Hospitalist Physical





- Physical exam


Narrative exam: 





 Not in cardiopulmonary distress. 


 The patient appeared well nourished and normally developed.


 Vital signs as documented.


 Head exam is unremarkable.


 No scleral icterus .


 Neck is without jugular venous distension, thyromegaly, or carotid bruits. 


 Lungs are clear to auscultation.


Cardiac exam reveals regular rate and  Rhythm. First and second heart sounds 

normal. No murmurs, rubs or gallops. 


Abdominal exam reveals normal bowel sounds, no masses, no organomegaly and no 

aortic enlargement. 


Extremities are nonedematous and both femoral and pedal pulses are normal.


Right groin draining abscess


CNS: Alert and oriented 3.  No focal weakness.





- Constitutional


Vitals: 


 











Temp Pulse Resp BP Pulse Ox


 


 98.2 F   58 L  20   117/57   99 


 


 09/15/18 11:37  09/15/18 11:37  09/15/18 11:37  09/15/18 11:37  09/15/18 11:37











General appearance: Present: no acute distress, mild distress, well-nourished





Results





- Labs


CBC & Chem 7: 


 09/15/18 04:16





 09/15/18 04:16


Labs: 


 Laboratory Last Values











WBC  10.8 K/mm3 (4.5-11.0)   09/15/18  04:16    


 


RBC  3.11 M/mm3 (3.65-5.03)  L  09/15/18  04:16    


 


Hgb  9.9 gm/dl (10.1-14.3)  L  09/15/18  04:16    


 


Hct  29.2 % (30.3-42.9)  L  09/15/18  04:16    


 


MCV  94 fl (79-97)   09/15/18  04:16    


 


MCH  32 pg (28-32)   09/15/18  04:16    


 


MCHC  34 % (30-34)   09/15/18  04:16    


 


RDW  13.4 % (13.2-15.2)   09/15/18  04:16    


 


Plt Count  362 K/mm3 (140-440)   09/15/18  04:16    


 


Lymph % (Auto)  19.7 % (13.4-35.0)   09/15/18  04:16    


 


Mono % (Auto)  7.4 % (0.0-7.3)  H  09/15/18  04:16    


 


Eos % (Auto)  2.2 % (0.0-4.3)   09/15/18  04:16    


 


Baso % (Auto)  0.2 % (0.0-1.8)   09/15/18  04:16    


 


Lymph #  2.1 K/mm3 (1.2-5.4)   09/15/18  04:16    


 


Mono #  0.8 K/mm3 (0.0-0.8)   09/15/18  04:16    


 


Eos #  0.2 K/mm3 (0.0-0.4)   09/15/18  04:16    


 


Baso #  0.0 K/mm3 (0.0-0.1)   09/15/18  04:16    


 


Seg Neutrophils %  70.5 % (40.0-70.0)  H  09/15/18  04:16    


 


Seg Neutrophils #  7.6 K/mm3 (1.8-7.7)   09/15/18  04:16    


 


D-Dimer  1680.08 ng/mlDDU (0-234)  H  09/14/18  13:53    


 


Sodium  134 mmol/L (137-145)  L  09/15/18  04:16    


 


Potassium  4.8 mmol/L (3.6-5.0)   09/15/18  04:16    


 


Chloride  99.6 mmol/L ()   09/15/18  04:16    


 


Carbon Dioxide  22 mmol/L (22-30)   09/15/18  04:16    


 


Anion Gap  17 mmol/L  09/15/18  04:16    


 


BUN  18 mg/dL (7-17)  H  09/15/18  04:16    


 


Creatinine  1.3 mg/dL (0.7-1.2)  H  09/15/18  04:16    


 


Estimated GFR  48 ml/min  09/15/18  04:16    


 


BUN/Creatinine Ratio  14 %  09/15/18  04:16    


 


Glucose  192 mg/dL ()  H  09/15/18  04:16    


 


POC Glucose  176  ()  H  09/15/18  06:37    


 


Hemoglobin A1c  9.0 % (4-6)  H  09/14/18  13:23    


 


Lactic Acid  1.10 mmol/L (0.7-2.0)   09/14/18  13:35    


 


Calcium  10.0 mg/dL (8.4-10.2)   09/15/18  04:16    


 


Total Bilirubin  0.50 mg/dL (0.1-1.2)   09/15/18  04:16    


 


AST  10 units/L (5-40)   09/15/18  04:16    


 


ALT  10 units/L (7-56)   09/15/18  04:16    


 


Alkaline Phosphatase  49 units/L ()   09/15/18  04:16    


 


Total Creatine Kinase  38 units/L ()   09/14/18  13:53    


 


CK-MB (CK-2)  1.9 ng/mL (0.0-4.0)   09/14/18  13:53    


 


CK-MB (CK-2) Rel Index  5.0  (0-4)  H  09/14/18  13:53    


 


Troponin T  < 0.010 ng/mL (0.00-0.029)   09/14/18  13:53    


 


NT-Pro-B Natriuret Pep  1596 pg/mL (0-900)  H  09/14/18  13:53    


 


Total Protein  6.2 g/dL (6.3-8.2)  L  09/15/18  04:16    


 


Albumin  3.2 g/dL (3.9-5)  L  09/15/18  04:16    


 


Albumin/Globulin Ratio  1.1 %  09/15/18  04:16    


 


Urine Color  Yellow  (Yellow)   09/14/18  14:08    


 


Urine Turbidity  Clear  (Clear)   09/14/18  14:08    


 


Urine pH  5.0  (5.0-7.0)   09/14/18  14:08    


 


Ur Specific Gravity  1.014  (1.003-1.030)   09/14/18  14:08    


 


Urine Protein  30 mg/dl mg/dL (Negative)   09/14/18  14:08    


 


Urine Glucose (UA)  >=500 mg/dL (Negative)   09/14/18  14:08    


 


Urine Ketones  Neg mg/dL (Negative)   09/14/18  14:08    


 


Urine Blood  Neg  (Negative)   09/14/18  14:08    


 


Urine Nitrite  Neg  (Negative)   09/14/18  14:08    


 


Urine Bilirubin  Neg  (Negative)   09/14/18  14:08    


 


Urine Urobilinogen  < 2.0 mg/dL (<2.0)   09/14/18  14:08    


 


Ur Leukocyte Esterase  Tr  (Negative)   09/14/18  14:08    


 


Urine WBC (Auto)  7.0 /HPF (0.0-6.0)  H  09/14/18  14:08    


 


Urine RBC (Auto)  3.0 /HPF (0.0-6.0)   09/14/18  14:08    


 


U Epithel Cells (Auto)  < 1.0 /HPF (0-13.0)   09/14/18  14:08    


 


Urine Mucus  Few /HPF  09/14/18  14:08

## 2018-09-15 NOTE — HISTORY AND PHYSICAL REPORT
History of Present Illness


Date of examination: 09/14/18


Date of admission: 


09/14/18 18:57





Chief complaint: 


CC





Increasing SOB -1 day


Increased drainage from Rt Inguinal wound site





History of present illness: 


History of Present Illness:





83-year-old female presents to emergency room  for dizziness and shortness of 

breath that started this morning.  Patient states 4 days ago she had an I&D 

done on her lower abdomen.  Patient states that the pain around the abdomen is 

increasing and it has become more hard and more discharge.  Patient states that 

the site is worsening.  Patient has come here to have the packing removed and 

changed.  Patient denies fever chills.  Patient denies bodyaches.  Patient 

states she is just not feeling well.  Patient states she feels weak.


MD Complaint: shortness of breath -: Sudden Severity: severe Consistency: 

constant Improves With: rest Worsens With: exertion, movement


Context: recent illness Associated Symptoms: denies other symptoms Treatments 

Prior to Arrival: none














 





 Past Medical History


Previous Medical History?: Yes


Hx Hypertension: Yes


Additional medical history: A FIB





Surgical History


Past Surgical History?: Yes


Additional Surgical History: LEG SURGERY CAUTERY TO VESSELS FOR A FIB





Family History


Family history: no significant





Social History


Smoking Status: Never Smoker


Substance Use Type: None





Medications


Home Medications: 


 Home Medications











 Medication  Instructions  Recorded  Confirmed  Last Taken  Type


 


Sulfamethoxazole/Trimethoprim 1 each PO BID #20 tablet 09/08/18 09/14/18 

Unknown Rx





[Bactrim DS TAB]     


 


Ibuprofen [Motrin 800 MG tab] 800 mg PO Q8HR PRN #8 tablet 09/11/18 09/14/18 

Unknown Rx


 


hydrOXYzine PAMOATE [Vistaril] 25 mg PO Q6HR PRN #12 capsule 09/11/18 09/14/18 

Unknown Rx


 


Allopurinol [Zyloprim] 100 mg PO QDAY 09/14/18 09/14/18 09/14/18 History


 


Metoprolol Tartrate 50 mg PO Q12H 09/14/18 09/14/18 09/14/18 History








Review of Systems


ROS: 


Stated complaint: REPACK PACKING


Other details as noted in HPI





Constitutional: malaise, weakness.  denies: chills, fever


Eyes: denies: eye pain, eye discharge, vision change


ENT: denies: ear pain, throat pain


Respiratory: shortness of breath.  denies: cough, wheezing


Cardiovascular: denies: chest pain, palpitations


Endocrine: no symptoms reported


Gastrointestinal: denies: abdominal pain, nausea, diarrhea


Genitourinary: denies: urgency, dysuria, discharge


Musculoskeletal: denies: back pain, joint swelling, arthralgia


Skin: denies: rash, lesions


Neurological: weakness, vertigo.  denies: headache, paresthesias


Psychiatric: denies: anxiety, depression


Hematological/Lymphatic: denies: easy bleeding, easy bruising

















Medications and Allergies


 Allergies











Allergy/AdvReac Type Severity Reaction Status Date / Time


 


Penicillins Allergy  Rash Verified 09/08/18 12:55











 Home Medications











 Medication  Instructions  Recorded  Confirmed  Last Taken  Type


 


Sulfamethoxazole/Trimethoprim 1 each PO BID #20 tablet 09/08/18 09/14/18 

Unknown Rx





[Bactrim DS TAB]     


 


Ibuprofen [Motrin 800 MG tab] 800 mg PO Q8HR PRN #8 tablet 09/11/18 09/14/18 

Unknown Rx


 


hydrOXYzine PAMOATE [Vistaril] 25 mg PO Q6HR PRN #12 capsule 09/11/18 09/14/18 

Unknown Rx


 


Allopurinol [Zyloprim] 100 mg PO QDAY 09/14/18 09/14/18 09/14/18 History


 


Metoprolol Tartrate 50 mg PO Q12H 09/14/18 09/14/18 09/14/18 History











Active Meds: 


Active Medications





Acetaminophen (Tylenol)  650 mg PO Q4H PRN


   PRN Reason: Pain MILD(1-3)/Fever >100.5/HA


Allopurinol (Zyloprim)  100 mg PO QDAY [facility]


   Last Admin: 09/14/18 22:59 Dose:  100 mg


Amlodipine Besylate (Norvasc)  10 mg PO QDAY [facility]


   Last Admin: 09/15/18 04:38 Dose:  Not Given


Enoxaparin Sodium (Lovenox)  30 mg SUB-Q QDAY [facility]


Famotidine (Pepcid)  20 mg PO DAILY [facility]


   Last Admin: 09/14/18 22:59 Dose:  20 mg


Hydralazine HCl (Apresoline)  20 mg IV Q4H PRN


   PRN Reason: Blood Pressure


   Last Admin: 09/15/18 02:40 Dose:  20 mg


Hydroxyzine Pamoate (Vistaril)  25 mg PO Q6HR PRN


   PRN Reason: Itching


   Last Admin: 09/14/18 23:58 Dose:  25 mg


Vancomycin HCl (Vancomycin/Ns 1 Gm/250 Ml)  1 gm in 250 mls @ 166.667 mls/hr IV 

Q24HR [facility]


Meropenem (Merrem/Ns 500 Mg/50 Ml)  500 mg in 50 mls @ 50 mls/hr IV Q6HR [facility]


Ibuprofen (Motrin)  800 mg PO Q8HR PRN


   PRN Reason: Pain , Severe (7-10)


   Last Admin: 09/14/18 23:58 Dose:  800 mg


Metoprolol Tartrate (Lopressor)  50 mg PO Q12H [facility]


   Last Admin: 09/14/18 20:13 Dose:  50 mg


Morphine Sulfate (Morphine)  2 mg IV Q4H PRN


   PRN Reason: Pain, Moderate (4-6)


Ondansetron HCl (Zofran)  4 mg IV Q8H PRN


   PRN Reason: Nausea And Vomiting


Oxycodone/Acetaminophen (Percocet 5/325)  1 tab PO Q6H PRN


   PRN Reason: Pain, Moderate (4-6)


   Last Admin: 09/15/18 04:40 Dose:  1 tab


Sodium Chloride (Sodium Chloride Flush Syringe 10 Ml)  10 ml IV BID [facility]


   Last Admin: 09/14/18 22:50 Dose:  Not Given


Sodium Chloride (Sodium Chloride Flush Syringe 10 Ml)  10 ml IV PRN PRN


   PRN Reason: LINE FLUSH











Exam





- Constitutional


Vitals: 


 











Temp Pulse Resp BP Pulse Ox


 


 99 F   60   18   134/50   99 


 


 09/15/18 00:36  09/15/18 04:24  09/15/18 00:36  09/15/18 04:24  09/15/18 04:24











General appearance: Present: no acute distress, mild distress, well-nourished





- EENT


Eyes: Present: PERRL


ENT: hearing intact, clear oral mucosa





- Neck


Neck: Present: supple, normal ROM





- Respiratory


Respiratory effort: normal


Respiratory: bilateral: CTA, rales (scattered)





- Cardiovascular


Heart rate: 49


Rhythm: regular


Heart Sounds: Present: S1 & S2.  Absent: rub, click





- Extremities


Extremities: no ischemia, pulses intact, pulses symmetrical, No edema


Peripheral Pulses: within normal limits





- Abdominal


General gastrointestinal: Present: soft, non-tender, non-distended, normal 

bowel sounds, other (#[id number] depth wound present above Rt Groin.ome 

drainage present.)


Female genitourinary: Present: normal





- Rectal


Rectal Exam: deferred





- Integumentary


Integumentary: Present: clear, warm, dry





- Musculoskeletal


Musculoskeletal: gait normal, strength equal bilaterally





- Psychiatric


Psychiatric: appropriate mood/affect, intact judgment & insight





- Neurologic


Neurologic: CNII-XII intact, moves all extremities





- Allied Health


Allied health notes reviewed: nursing, case management





Results





- Labs


CBC & Chem 7: 


 09/14/18 13:23





 09/14/18 13:23


Labs: 


 Laboratory Last Values











WBC  11.5 K/mm3 (4.5-11.0)  H  09/14/18  13:23    


 


RBC  3.37 M/mm3 (3.65-5.03)  L  09/14/18  13:23    


 


Hgb  10.7 gm/dl (10.1-14.3)   09/14/18  13:23    


 


Hct  32.3 % (30.3-42.9)   09/14/18  13:23    


 


MCV  96 fl (79-97)   09/14/18  13:23    


 


MCH  32 pg (28-32)   09/14/18  13:23    


 


MCHC  33 % (30-34)   09/14/18  13:23    


 


RDW  13.7 % (13.2-15.2)   09/14/18  13:23    


 


Plt Count  403 K/mm3 (140-440)   09/14/18  13:23    


 


Lymph % (Auto)  13.4 % (13.4-35.0)   09/14/18  13:23    


 


Mono % (Auto)  6.7 % (0.0-7.3)   09/14/18  13:23    


 


Eos % (Auto)  2.3 % (0.0-4.3)   09/14/18  13:23    


 


Baso % (Auto)  0.3 % (0.0-1.8)   09/14/18  13:23    


 


Lymph #  1.5 K/mm3 (1.2-5.4)   09/14/18  13:23    


 


Mono #  0.8 K/mm3 (0.0-0.8)   09/14/18  13:23    


 


Eos #  0.3 K/mm3 (0.0-0.4)   09/14/18  13:23    


 


Baso #  0.0 K/mm3 (0.0-0.1)   09/14/18  13:23    


 


Seg Neutrophils %  77.3 % (40.0-70.0)  H  09/14/18  13:23    


 


Seg Neutrophils #  8.9 K/mm3 (1.8-7.7)  H  09/14/18  13:23    


 


D-Dimer  1680.08 ng/mlDDU (0-234)  H  09/14/18  13:53    


 


Sodium  131 mmol/L (137-145)  L  09/14/18  13:23    


 


Potassium  5.1 mmol/L (3.6-5.0)  H  09/14/18  13:23    


 


Chloride  93.7 mmol/L ()  L  09/14/18  13:23    


 


Carbon Dioxide  25 mmol/L (22-30)   09/14/18  13:23    


 


Anion Gap  17 mmol/L  09/14/18  13:23    


 


BUN  21 mg/dL (7-17)  H  09/14/18  13:23    


 


Creatinine  1.4 mg/dL (0.7-1.2)  H  09/14/18  13:23    


 


Estimated GFR  44 ml/min  09/14/18  13:23    


 


BUN/Creatinine Ratio  15 %  09/14/18  13:23    


 


Glucose  294 mg/dL ()  H  09/14/18  13:23    


 


Hemoglobin A1c  9.0 % (4-6)  H  09/14/18  13:23    


 


Lactic Acid  1.10 mmol/L (0.7-2.0)   09/14/18  13:35    


 


Calcium  10.1 mg/dL (8.4-10.2)   09/14/18  13:23    


 


Total Creatine Kinase  38 units/L ()   09/14/18  13:53    


 


CK-MB (CK-2)  1.9 ng/mL (0.0-4.0)   09/14/18  13:53    


 


CK-MB (CK-2) Rel Index  5.0  (0-4)  H  09/14/18  13:53    


 


Troponin T  < 0.010 ng/mL (0.00-0.029)   09/14/18  13:53    


 


NT-Pro-B Natriuret Pep  1596 pg/mL (0-900)  H  09/14/18  13:53    


 


Urine Color  Yellow  (Yellow)   09/14/18  14:08    


 


Urine Turbidity  Clear  (Clear)   09/14/18  14:08    


 


Urine pH  5.0  (5.0-7.0)   09/14/18  14:08    


 


Ur Specific Gravity  1.014  (1.003-1.030)   09/14/18  14:08    


 


Urine Protein  30 mg/dl mg/dL (Negative)   09/14/18  14:08    


 


Urine Glucose (UA)  >=500 mg/dL (Negative)   09/14/18  14:08    


 


Urine Ketones  Neg mg/dL (Negative)   09/14/18  14:08    


 


Urine Blood  Neg  (Negative)   09/14/18  14:08    


 


Urine Nitrite  Neg  (Negative)   09/14/18  14:08    


 


Urine Bilirubin  Neg  (Negative)   09/14/18  14:08    


 


Urine Urobilinogen  < 2.0 mg/dL (<2.0)   09/14/18  14:08    


 


Ur Leukocyte Esterase  Tr  (Negative)   09/14/18  14:08    


 


Urine WBC (Auto)  7.0 /HPF (0.0-6.0)  H  09/14/18  14:08    


 


Urine RBC (Auto)  3.0 /HPF (0.0-6.0)   09/14/18  14:08    


 


U Epithel Cells (Auto)  < 1.0 /HPF (0-13.0)   09/14/18  14:08    


 


Urine Mucus  Few /HPF  09/14/18  14:08    








 Short CBC











  09/14/18 Range/Units





  13:23 


 


WBC  11.5 H  (4.5-11.0)  K/mm3


 


Hgb  10.7  (10.1-14.3)  gm/dl


 


Hct  32.3  (30.3-42.9)  %


 


Plt Count  403  (140-440)  K/mm3








 BMP











  09/14/18





  13:23


 


Sodium  131 L


 


Potassium  5.1 H


 


Chloride  93.7 L


 


Carbon Dioxide  25


 


BUN  21 H


 


Creatinine  1.4 H


 


Glucose  294 H


 


Calcium  10.1








 Cardiac Enzymes











  09/14/18 Range/Units





  13:53 


 


Total Creatine Kinase  38  ()  units/L


 


CK-MB (CK-2)  1.9  (0.0-4.0)  ng/mL


 


Troponin T  < 0.010  (0.00-0.029)  ng/mL








 Urine











  09/14/18 Range/Units





  14:08 


 


Urine Color  Yellow  (Yellow)  


 


Urine pH  5.0  (5.0-7.0)  


 


Ur Specific Gravity  1.014  (1.003-1.030)  


 


Urine Protein  30 mg/dl  (Negative)  mg/dL


 


Urine Glucose (UA)  >=500  (Negative)  mg/dL














- Imaging and Cardiology


EKG: report reviewed (Sinus Bradycardia)


Imaging and Cardiology: 


V/q scan





CONCLUSION: Low probability exam for pulmonary embolism





CXR


CONCLUSION: No acute chest process, as described. 











Assessment and Plan


Advance Directives: Yes (Full code)


VTE prophylaxis?: Chemical


Plan of care discussed with patient/family: Yes





- Patient Problems


(1) Sepsis


Current Visit: Yes   Status: Acute   


Qualifiers: 


   Sepsis type: sepsis due to unspecified organism   Qualified Code(s): A41.9 - 

Sepsis, unspecified organism   


Plan to address problem: 


Sec to Rt  Groin abscess whish was Iand D'd 5 days ago.Continues to drain.


IV Meropenem and IV Vancomycin


ID Consult


Surgery consult








(2) Hyperkalemia


Current Visit: Yes   Status: Acute   


Plan to address problem: 


Mild corrected


IV calcium Gluconate








(3) Acute exacerbation of CHF (congestive heart failure)


Current Visit: Yes   Status: Acute   


Qualifiers: 


   Heart failure type: combined systolic and diastolic   Qualified Code(s): 

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

congestive) heart failure   


Plan to address problem: 


Will gt echo for EF


IV Lasix  q24








(4) JOSH (acute kidney injury)


Current Visit: Yes   Status: Acute   


Plan to address problem: 


Cr 1.4


Will defer to Nephrology








(5) New onset type 2 diabetes mellitus


Current Visit: Yes   Status: Acute   


Plan to address problem: 


Patient has A1c of 9 and Glucose levels of 260


Will get coverage


Started Glimepride 2 mg po qd








(6) HTN (hypertension)


Current Visit: Yes   Status: Chronic   


Qualifiers: 


   Hypertension type: essential hypertension   Qualified Code(s): I10 - 

Essential (primary) hypertension   


Plan to address problem: 


Cont Metoprolol








(7) Gout


Current Visit: Yes   Status: Inactive   


Plan to address problem: 


Cont Allopurinol








(8) Hyponatremia


Current Visit: Yes   Status: Chronic   


Plan to address problem: 


Mild 











(9) DVT prophylaxis


Current Visit: Yes   Status: Acute   


Plan to address problem: 


On Lovenox

## 2018-09-16 LAB
BASOPHILS # (AUTO): 0 K/MM3 (ref 0–0.1)
BASOPHILS NFR BLD AUTO: 0.5 % (ref 0–1.8)
BUN SERPL-MCNC: 18 MG/DL (ref 7–17)
BUN/CREAT SERPL: 14 %
CALCIUM SERPL-MCNC: 9.8 MG/DL (ref 8.4–10.2)
EOSINOPHIL # BLD AUTO: 0.3 K/MM3 (ref 0–0.4)
EOSINOPHIL NFR BLD AUTO: 4.2 % (ref 0–4.3)
HCT VFR BLD CALC: 29.8 % (ref 30.3–42.9)
HEMOLYSIS INDEX: 2
HGB BLD-MCNC: 10.3 GM/DL (ref 10.1–14.3)
LYMPHOCYTES # BLD AUTO: 2.3 K/MM3 (ref 1.2–5.4)
LYMPHOCYTES NFR BLD AUTO: 28 % (ref 13.4–35)
MCH RBC QN AUTO: 32 PG (ref 28–32)
MCHC RBC AUTO-ENTMCNC: 35 % (ref 30–34)
MCV RBC AUTO: 94 FL (ref 79–97)
MONOCYTES # (AUTO): 0.6 K/MM3 (ref 0–0.8)
MONOCYTES % (AUTO): 7.3 % (ref 0–7.3)
PLATELET # BLD: 406 K/MM3 (ref 140–440)
RBC # BLD AUTO: 3.18 M/MM3 (ref 3.65–5.03)

## 2018-09-16 RX ADMIN — FAMOTIDINE SCH MG: 20 TABLET ORAL at 11:16

## 2018-09-16 RX ADMIN — ENOXAPARIN SODIUM SCH MG: 100 INJECTION SUBCUTANEOUS at 11:16

## 2018-09-16 RX ADMIN — Medication SCH ML: at 22:12

## 2018-09-16 RX ADMIN — HYDRALAZINE HYDROCHLORIDE PRN MG: 20 INJECTION INTRAMUSCULAR; INTRAVENOUS at 06:21

## 2018-09-16 RX ADMIN — ALLOPURINOL SCH MG: 100 TABLET ORAL at 11:16

## 2018-09-16 RX ADMIN — OXYCODONE AND ACETAMINOPHEN PRN TAB: 5; 325 TABLET ORAL at 06:18

## 2018-09-16 RX ADMIN — GLIMEPIRIDE SCH MG: 2 TABLET ORAL at 08:59

## 2018-09-16 RX ADMIN — METOPROLOL TARTRATE SCH MG: 50 TABLET, FILM COATED ORAL at 09:03

## 2018-09-16 RX ADMIN — METOPROLOL TARTRATE SCH MG: 50 TABLET, FILM COATED ORAL at 20:10

## 2018-09-16 RX ADMIN — Medication SCH ML: at 11:17

## 2018-09-16 RX ADMIN — VANCOMYCIN HYDROCHLORIDE SCH MLS/HR: 5 INJECTION, POWDER, LYOPHILIZED, FOR SOLUTION INTRAVENOUS at 11:41

## 2018-09-16 RX ADMIN — OXYCODONE AND ACETAMINOPHEN PRN TAB: 5; 325 TABLET ORAL at 22:11

## 2018-09-16 RX ADMIN — HYDRALAZINE HYDROCHLORIDE SCH MG: 100 TABLET, FILM COATED ORAL at 22:11

## 2018-09-16 RX ADMIN — MEROPENEM SCH MLS/HR: 500 INJECTION, POWDER, FOR SOLUTION INTRAVENOUS at 11:19

## 2018-09-16 RX ADMIN — AMLODIPINE BESYLATE SCH MG: 10 TABLET ORAL at 11:46

## 2018-09-16 NOTE — PROGRESS NOTE
Assessment and Plan


Assessment and plan: 


83-year-old female was presented yesterday to the emergency department 

complaining of shortness of breath, increased draining of abscess from the 

groin area.  Patient has incision and drainage 5 days ago








Infected right groin abscess


- Patient was  on IV meropenem and vancomycin


- Gen. surgery consulted and changed the pack and believe no infection


- I d/cherri the antibiotics


- We will discharge tomorrow after wound care follow-up is arranged


JOSH


- Nephrology consulted


- Creatinine stable


New-onset diabetes mellitus


- Elevated hemoglobin A1c


- Sliding-scale insulin, Accu-Chek, adjust insulin as needed


Diastolic CHF, hypertension


- Echo was done and showed grade 2 diastolic dysfunction


- We'll manage hypertension


History of gout


- Continue allopurinol


DVT prophylaxis


- Lovenox


Disposition


- Discharge tomorrow, after wound care follow-up is arranged





History


Interval history: 





Patient was seen and about this morning, patient denied fever, chills, 

shortness of breath is getting better. No discharge from the wound.  





Hospitalist Physical





- Physical exam


Narrative exam: 





 Not in cardiopulmonary distress. 


 The patient appeared well nourished and normally developed.


 Vital signs as documented.


 Head exam is unremarkable.


 No scleral icterus .


 Neck is without jugular venous distension, thyromegaly, or carotid bruits. 


 Lungs are clear to auscultation.


Cardiac exam reveals regular rate and  Rhythm. First and second heart sounds 

normal. No murmurs, rubs or gallops. 


Abdominal exam reveals normal bowel sounds, no masses, no organomegaly and no 

aortic enlargement. 


Extremities are nonedematous and both femoral and pedal pulses are normal.


Right groin draining abscess


CNS: Alert and oriented 3.  No focal weakness.





- Constitutional


Vitals: 


 











Temp Pulse Resp BP Pulse Ox


 


 98.7 F   60   18   182/72   98 


 


 09/16/18 08:28  09/16/18 12:33  09/16/18 12:33  09/16/18 12:33  09/16/18 12:33











General appearance: Present: no acute distress, mild distress, well-nourished





Results





- Labs


CBC & Chem 7: 


 09/16/18 03:33





 09/16/18 03:33


Labs: 


 Laboratory Last Values











WBC  8.1 K/mm3 (4.5-11.0)   09/16/18  03:33    


 


RBC  3.18 M/mm3 (3.65-5.03)  L  09/16/18  03:33    


 


Hgb  10.3 gm/dl (10.1-14.3)   09/16/18  03:33    


 


Hct  29.8 % (30.3-42.9)  L  09/16/18  03:33    


 


MCV  94 fl (79-97)   09/16/18  03:33    


 


MCH  32 pg (28-32)   09/16/18  03:33    


 


MCHC  35 % (30-34)  H  09/16/18  03:33    


 


RDW  13.6 % (13.2-15.2)   09/16/18  03:33    


 


Plt Count  406 K/mm3 (140-440)   09/16/18  03:33    


 


Lymph % (Auto)  28.0 % (13.4-35.0)   09/16/18  03:33    


 


Mono % (Auto)  7.3 % (0.0-7.3)   09/16/18  03:33    


 


Eos % (Auto)  4.2 % (0.0-4.3)   09/16/18  03:33    


 


Baso % (Auto)  0.5 % (0.0-1.8)   09/16/18  03:33    


 


Lymph #  2.3 K/mm3 (1.2-5.4)   09/16/18  03:33    


 


Mono #  0.6 K/mm3 (0.0-0.8)   09/16/18  03:33    


 


Eos #  0.3 K/mm3 (0.0-0.4)   09/16/18  03:33    


 


Baso #  0.0 K/mm3 (0.0-0.1)   09/16/18  03:33    


 


Seg Neutrophils %  60.0 % (40.0-70.0)   09/16/18  03:33    


 


Seg Neutrophils #  4.9 K/mm3 (1.8-7.7)   09/16/18  03:33    


 


D-Dimer  1680.08 ng/mlDDU (0-234)  H  09/14/18  13:53    


 


Sodium  136 mmol/L (137-145)  L  09/16/18  03:33    


 


Potassium  4.9 mmol/L (3.6-5.0)   09/16/18  03:33    


 


Chloride  100.7 mmol/L ()   09/16/18  03:33    


 


Carbon Dioxide  24 mmol/L (22-30)   09/16/18  03:33    


 


Anion Gap  16 mmol/L  09/16/18  03:33    


 


BUN  18 mg/dL (7-17)  H  09/16/18  03:33    


 


Creatinine  1.3 mg/dL (0.7-1.2)  H  09/16/18  03:33    


 


Estimated GFR  48 ml/min  09/16/18  03:33    


 


BUN/Creatinine Ratio  14 %  09/16/18  03:33    


 


Glucose  137 mg/dL ()  H  09/16/18  03:33    


 


POC Glucose  201  ()  H  09/16/18  12:31    


 


Hemoglobin A1c  9.0 % (4-6)  H  09/14/18  13:23    


 


Lactic Acid  1.10 mmol/L (0.7-2.0)   09/14/18  13:35    


 


Calcium  9.8 mg/dL (8.4-10.2)   09/16/18  03:33    


 


Total Bilirubin  0.50 mg/dL (0.1-1.2)   09/15/18  04:16    


 


AST  10 units/L (5-40)   09/15/18  04:16    


 


ALT  10 units/L (7-56)   09/15/18  04:16    


 


Alkaline Phosphatase  49 units/L ()   09/15/18  04:16    


 


Total Creatine Kinase  38 units/L ()   09/14/18  13:53    


 


CK-MB (CK-2)  1.9 ng/mL (0.0-4.0)   09/14/18  13:53    


 


CK-MB (CK-2) Rel Index  5.0  (0-4)  H  09/14/18  13:53    


 


Troponin T  < 0.010 ng/mL (0.00-0.029)   09/14/18  13:53    


 


NT-Pro-B Natriuret Pep  1596 pg/mL (0-900)  H  09/14/18  13:53    


 


Total Protein  6.2 g/dL (6.3-8.2)  L  09/15/18  04:16    


 


Albumin  3.2 g/dL (3.9-5)  L  09/15/18  04:16    


 


Albumin/Globulin Ratio  1.1 %  09/15/18  04:16    


 


Urine Color  Yellow  (Yellow)   09/14/18  14:08    


 


Urine Turbidity  Clear  (Clear)   09/14/18  14:08    


 


Urine pH  5.0  (5.0-7.0)   09/14/18  14:08    


 


Ur Specific Gravity  1.014  (1.003-1.030)   09/14/18  14:08    


 


Urine Protein  30 mg/dl mg/dL (Negative)   09/14/18  14:08    


 


Urine Glucose (UA)  >=500 mg/dL (Negative)   09/14/18  14:08    


 


Urine Ketones  Neg mg/dL (Negative)   09/14/18  14:08    


 


Urine Blood  Neg  (Negative)   09/14/18  14:08    


 


Urine Nitrite  Neg  (Negative)   09/14/18  14:08    


 


Urine Bilirubin  Neg  (Negative)   09/14/18  14:08    


 


Urine Urobilinogen  < 2.0 mg/dL (<2.0)   09/14/18  14:08    


 


Ur Leukocyte Esterase  Tr  (Negative)   09/14/18  14:08    


 


Urine WBC (Auto)  7.0 /HPF (0.0-6.0)  H  09/14/18  14:08    


 


Urine RBC (Auto)  3.0 /HPF (0.0-6.0)   09/14/18  14:08    


 


U Epithel Cells (Auto)  < 1.0 /HPF (0-13.0)   09/14/18  14:08    


 


Urine Mucus  Few /HPF  09/14/18  14:08    


 


Vancomycin Trough  29.1 ug/mL (5.0-20.0)  H  09/15/18  13:00

## 2018-09-16 NOTE — PROGRESS NOTE
Assessment and Plan





Impression:


* JOSH on ckd


* Volume depletion


* UTI


* abcess, wound infection


* sob








Plan:


* no indication for rrt


* cr is stable today


* iv abx for uti


* strict i/os


* avoid nephrotoxins


* will follow lytes prn


* ok to dc home





Subjective


Date of service: 09/16/18


Principal diagnosis: ckd


Interval history: 





resting in bed





Objective





- Exam


Narrative Exam: 





- General


Limitations: No Limitations


General appearance: alert, in no apparent distress





- Head


Head exam: Present: atraumatic, normocephalic





- Eye


Eye exam: Present: normal appearance





- ENT


ENT exam: Present: mucous membranes moist





- Neck


Neck exam: Present: normal inspection





- Respiratory


Respiratory exam: Present: normal lung sounds bilaterally.  Absent: respiratory 

distress





- Cardiovascular


Cardiovascular Exam: Present: regular rate, normal rhythm.  Absent: systolic 

murmur, diastolic murmur, rubs, gallop





- GI/Abdominal


GI/Abdominal exam: Present: soft, normal bowel sounds





- Extremities Exam


Extremities exam: Present: normal inspection





- Back Exam


Back exam: Present: normal inspection





- Neurological Exam


Neurological exam: Present: alert, oriented X3





- Psychiatric


Psychiatric exam: Present: normal affect, normal mood





- Skin


Skin exam: Present: warm, dry, normal color, erythema (right lower abdomen I&D 

site hard and red and indurated with a purulent discharge).  Absent: rash








- Vital Signs


Vital signs: 


 Vital Signs - 12hr











  09/16/18 09/16/18 09/16/18





  04:26 05:42 06:18


 


Temperature  98.9 F 


 


Pulse Rate 62 63 


 


Respiratory  18 20





Rate   


 


Blood Pressure   


 


Blood Pressure  179/68 





[Left]   


 


O2 Sat by Pulse 99 94 





Oximetry   














  09/16/18 09/16/18 09/16/18





  06:21 07:18 08:28


 


Temperature   98.7 F


 


Pulse Rate 63  67


 


Respiratory  20 20





Rate   


 


Blood Pressure 179/68  152/67


 


Blood Pressure   





[Left]   


 


O2 Sat by Pulse   98





Oximetry   














  09/16/18 09/16/18 09/16/18





  09:03 10:00 11:46


 


Temperature   


 


Pulse Rate 73 66 53 L


 


Respiratory   





Rate   


 


Blood Pressure 143/74  158/52


 


Blood Pressure   





[Left]   


 


O2 Sat by Pulse   





Oximetry   














  09/16/18





  12:33


 


Temperature 


 


Pulse Rate 60


 


Respiratory 18





Rate 


 


Blood Pressure 182/72


 


Blood Pressure 





[Left] 


 


O2 Sat by Pulse 98





Oximetry 














- Lab





 09/16/18 03:33





 09/16/18 03:33


 Most recent lab results











Calcium  9.8 mg/dL (8.4-10.2)   09/16/18  03:33

## 2018-09-17 VITALS — SYSTOLIC BLOOD PRESSURE: 162 MMHG | DIASTOLIC BLOOD PRESSURE: 64 MMHG

## 2018-09-17 LAB
BUN SERPL-MCNC: 20 MG/DL (ref 7–17)
BUN/CREAT SERPL: 15 %
CALCIUM SERPL-MCNC: 9.9 MG/DL (ref 8.4–10.2)
HEMOLYSIS INDEX: 12

## 2018-09-17 RX ADMIN — ENOXAPARIN SODIUM SCH MG: 100 INJECTION SUBCUTANEOUS at 10:28

## 2018-09-17 RX ADMIN — FAMOTIDINE SCH MG: 20 TABLET ORAL at 10:29

## 2018-09-17 RX ADMIN — AMLODIPINE BESYLATE SCH MG: 10 TABLET ORAL at 10:29

## 2018-09-17 RX ADMIN — ALLOPURINOL SCH MG: 100 TABLET ORAL at 10:29

## 2018-09-17 RX ADMIN — HYDRALAZINE HYDROCHLORIDE SCH MG: 100 TABLET, FILM COATED ORAL at 08:28

## 2018-09-17 RX ADMIN — Medication SCH ML: at 10:30

## 2018-09-17 RX ADMIN — GLIMEPIRIDE SCH MG: 2 TABLET ORAL at 08:29

## 2018-09-17 RX ADMIN — METOPROLOL TARTRATE SCH MG: 50 TABLET, FILM COATED ORAL at 06:51

## 2018-09-17 NOTE — DISCHARGE SUMMARY
Providers





- Providers


Date of Admission: 


09/14/18 18:57





Date of discharge: 09/17/18


Attending physician: 


HAYDEE MEIER MD





 





09/15/18 01:46


Consult to Wound/ET Nurse [CONS] Routine 


   Reason For Exam: wound eval





09/15/18 05:47


Consult to Physician [CONS] Routine 


   Comment: 


   Consulting Provider: AVE PARADA


   Physician Instructions: 


   Reason For Exam: Rt Groin abscess with I/D





09/15/18 05:48


Consult to Physician [CONS] Routine 


   Comment: 


   Consulting Provider: ARCHIE MAHER


   Physician Instructions: 


   Reason For Exam: Abscess Rt Groin





09/15/18 05:49


Consult to Physician [CONS] Routine 


   Comment: 


   Consulting Provider: BHAVNA FALL


   Physician Instructions: 


   Reason For Exam: Josh


Consult to Wound/ET Nurse [CONS] Routine 


   Reason For Exam: wound eval





09/15/18 14:45


Consult to Case Management [CONS] Routine 


   Services Needed at Discharge: Home Health Services


   Notified:: 


   Additional Physician Instructions: Please arrange Home health for wound 

care. Patient will also need to go to


                                        [facility] after discharge.











Primary care physician: 


PRIMARY CARE MD








Hospitalization


Reason for admission: Acute on chronic CHF exacerbation


Condition: Stable


Disposition: [id number] HOME UNDER HOME [facility]


Time spent for discharge: 32 minutes





- Discharge Diagnoses


(1) JOSH (acute kidney injury)


Status: Acute   





(2) Abscess


Status: Acute   





(3) Acute exacerbation of CHF (congestive heart failure)


Status: Acute   


Qualifiers: 


   Heart failure type: combined systolic and diastolic   Qualified Code(s): 

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

congestive) heart failure   





(4) New onset type 2 diabetes mellitus


Status: Acute   





Core Measure Documentation





- Palliative Care


Palliative Care/ Comfort Measures: Not Applicable





- Core Measures


Any of the following diagnoses?: none, history only (Diastolic CHF)





Exam





- Physical Exam


Narrative exam: 





 Not in cardiopulmonary distress. 


 The patient appeared well nourished and normally developed.


 Vital signs as documented.


 Head exam is unremarkable.


 No scleral icterus .


 Neck is without jugular venous distension, thyromegaly, or carotid bruits. 


 Lungs are clear to auscultation.


Cardiac exam reveals regular rate and  Rhythm. First and second heart sounds 

normal. No murmurs, rubs or gallops. 


Abdominal exam reveals normal bowel sounds, no masses, no organomegaly and no 

aortic enlargement. 


Extremities are nonedematous and both femoral and pedal pulses are normal.


Right groin draining abscess


CNS: Alert and oriented 3.  No focal weakness.





- Constitutional


Vitals: 


 











Temp Pulse Resp BP Pulse Ox


 


 98.9 F   58 L  20   167/57   97 


 


 09/17/18 08:17  09/17/18 08:46  09/17/18 08:17  09/17/18 08:17  09/17/18 08:17














Plan


Activity: no restrictions


Weight Bearing Status: Full Weight Bearing


Diet: low salt, diabetic


Wound: per your surgeon's advice, per wound nurse instructions


Additional Instructions: Follow up at [facility] in 1-2 weeks


Follow up with: 


PRIMARY CARE,MD [Primary Care Provider] - 7 Days


Prescriptions: 


amLODIPine [Norvasc] 10 mg PO QDAY #30 tablet


Doxycycline [Vibramycin CAP] 100 mg PO Q12HR #14 capsule


Glimepiride [Amaryl] 2 mg PO QDDIAB #60 tablet


hydrALAZINE [Apresoline TAB] 100 mg PO TID #90 tab


oxyCODONE /ACETAMINOPHEN [Percocet 5/325 mg] 1 tab PO Q6H PRN #12 tablet


 PRN Reason: Pain, Moderate (4-6)

## 2018-09-17 NOTE — PROGRESS NOTE
Subjective


Principal diagnosis: ckd


Interval history: 


Patient was seen today for follow-up of multiple renal related issues


No complaints of any chest pain pressure or shortness of breath


Interdisciplinary notes that also reviewed


Events of 24 hours vitals labs intake output medications were reviewed








Past medical history: Reviewed


Family history: Reviewed


Social history: Reviewed


Allergies: Reviewed








Physical examination:


Vitals: Reviewed


HEENT: No pallor or icterus oral mucosa moist 


Neck: Supple no JVD no thyromegaly


Chest: Bilateral clear to auscultation anteriorly


Heart: Regular rate and rhythm S1-S2 heard no S3-S4


Abdomen: Soft nontender no voluntary guarding rigidity rebound


Extremity: Dry skin less than 1+ peripheral edema


Psychiatric: No evidence of agitation and aggression noted


Dermatology: No petechial rashes





Labs and x-rays: Reviewed from today





Assessment and plan


JOSH with CKD,renal function currently stable


Patient wants to follow-up in outpatient


Made aware about the renal related issues


avoid nonsteroidal drugs


To be seen in the office in next 2 weeks


All questions were answered and simple English


We'll continue to follow and make recommendation for renal standpoint


he





Objective





- Vital Signs


Vital signs: 


 Vital Signs - 12hr











  09/16/18 09/16/18 09/17/18





  23:11 23:59 05:01


 


Temperature  98.8 F 98.8 F


 


Pulse Rate  64 63


 


Pulse Rate [   





Apical]   


 


Pulse Rate [   





Left Dorsalis   





Pedis]   


 


Pulse Rate [   





Left Radial]   


 


Pulse Rate [   





Right Dorsalis   





Pedis]   


 


Pulse Rate [   





Right Radial]   


 


Respiratory 20 20 18





Rate   


 


Blood Pressure  124/47 146/58


 


O2 Sat by Pulse  97 95





Oximetry   














  09/17/18 09/17/18 09/17/18





  06:51 08:17 08:46


 


Temperature  98.9 F 


 


Pulse Rate 64 62 58 L


 


Pulse Rate [   





Apical]   


 


Pulse Rate [   





Left Dorsalis   





Pedis]   


 


Pulse Rate [   





Left Radial]   


 


Pulse Rate [   





Right Dorsalis   





Pedis]   


 


Pulse Rate [   





Right Radial]   


 


Respiratory  20 





Rate   


 


Blood Pressure 150/64 167/57 


 


O2 Sat by Pulse  97 





Oximetry   














  09/17/18 09/17/18





  10:00 10:29


 


Temperature  


 


Pulse Rate  60


 


Pulse Rate [ 60 





Apical]  


 


Pulse Rate [ 60 





Left Dorsalis  





Pedis]  


 


Pulse Rate [ 60 





Left Radial]  


 


Pulse Rate [ 60 





Right Dorsalis  





Pedis]  


 


Pulse Rate [ 60 





Right Radial]  


 


Respiratory 20 





Rate  


 


Blood Pressure  136/60


 


O2 Sat by Pulse 98 





Oximetry  














- Lab





 09/16/18 03:33





 09/17/18 05:04


 Most recent lab results











Calcium  9.9 mg/dL (8.4-10.2)   09/17/18  05:04

## 2018-09-17 NOTE — CONSULTATION
History of Present Illness





- Reason for Consult


Consult date: 09/17/18


right groin boil


Requesting physician: ROSA LOREDO





- History of Present Illness


84 y/o female with history of CHF and HTN; admitted on 09/14/2018 due to a week 

history of right groin swelling, edema, erythema.  She was seen previously in 

the ED and underwent I&D 4 days before admission.  Unfortunately the patient 

became with dizziness and shortness of breath that started the day of 

admission.  Patient has come here to have the packing removed and changed.  

Patient denies recent fever, chills. 





In the ED, initial temperature 98.9, heart rate 50, respiration 14, O2 sat 

monitor, blood pressure 184/67.  Initial white count 11.5.  Hemoglobin 10.7.  

Platelets 403.  Creatinine 1.4.  Potassium 5.1.  A1c 9.





Microbiology:


Blood cultures:


9/14 ngtd





Current Antimicrobials:


Vancomycin


Meropenem








Previous Antimicrobials:





Past History


Past Medical History: atrial fib


Past Surgical History: Other (a fib ablation)


Social history: denies: smoking, alcohol abuse


Family history: no significant family history





Medications and Allergies


 Allergies











Allergy/AdvReac Type Severity Reaction Status Date / Time


 


Penicillins Allergy  Rash Verified 09/08/18 12:55











 Home Medications











 Medication  Instructions  Recorded  Confirmed  Last Taken  Type


 


hydrOXYzine PAMOATE [Vistaril] 25 mg PO Q6HR PRN #12 capsule 09/11/18 09/14/18 

Unknown Rx


 


Allopurinol [Zyloprim] 100 mg PO QDAY 09/14/18 09/14/18 09/14/18 History


 


Metoprolol Tartrate 50 mg PO Q12H 09/14/18 09/14/18 09/14/18 History


 


Glimepiride [Amaryl] 2 mg PO QDDIAB #60 tablet 09/17/18  Unknown Rx


 


amLODIPine [Norvasc] 10 mg PO QDAY #30 tablet 09/17/18  Unknown Rx


 


hydrALAZINE [Apresoline TAB] 100 mg PO TID #90 tab 09/17/18  Unknown Rx


 


oxyCODONE /ACETAMINOPHEN [Percocet 1 tab PO Q6H PRN #12 tablet 09/17/18  

Unknown Rx





5/325 mg]     











Active Meds: 


Active Medications





Acetaminophen (Tylenol)  650 mg PO Q4H PRN


   PRN Reason: Pain MILD(1-3)/Fever >100.5/HA


Allopurinol (Zyloprim)  100 mg PO QDAY [facility]


   Last Admin: 09/17/18 10:29 Dose:  100 mg


Amlodipine Besylate (Norvasc)  10 mg PO QDAY [facility]


   Last Admin: 09/17/18 10:29 Dose:  10 mg


Enoxaparin Sodium (Lovenox)  30 mg SUB-Q QDAY [facility]


   Last Admin: 09/17/18 10:28 Dose:  30 mg


Famotidine (Pepcid)  20 mg PO DAILY [facility]


   Last Admin: 09/17/18 10:29 Dose:  20 mg


Glimepiride (Amaryl)  2 mg PO QDDIAB [facility]


   Last Admin: 09/17/18 08:29 Dose:  2 mg


Hydralazine HCl (Apresoline)  20 mg IV Q4H PRN


   PRN Reason: Blood Pressure


   Last Admin: 09/16/18 06:21 Dose:  20 mg


Hydralazine HCl (Apresoline)  100 mg PO TID [facility]


   Last Admin: 09/17/18 08:28 Dose:  100 mg


Hydroxyzine Pamoate (Vistaril)  25 mg PO Q6HR PRN


   PRN Reason: Itching


   Last Admin: 09/15/18 06:30 Dose:  25 mg


Insulin Human Lispro (Humalog)  0 unit SUB-Q ACHS [facility]; Protocol


   Last Admin: 09/17/18 12:37 Dose:  3 unit


Metoprolol Tartrate (Lopressor)  50 mg PO Q12H [facility]


   Last Admin: 09/17/18 06:51 Dose:  50 mg


Morphine Sulfate (Morphine)  2 mg IV Q4H PRN


   PRN Reason: Pain, Moderate (4-6)


   Last Admin: 09/15/18 13:37 Dose:  2 mg


Ondansetron HCl (Zofran)  4 mg IV Q8H PRN


   PRN Reason: Nausea And Vomiting


   Last Admin: 09/15/18 13:37 Dose:  4 mg


Oxycodone/Acetaminophen (Percocet 5/325)  1 tab PO Q6H PRN


   PRN Reason: Pain, Moderate (4-6)


   Last Admin: 09/16/18 22:11 Dose:  1 tab


Sodium Chloride (Sodium Chloride Flush Syringe 10 Ml)  10 ml IV BID [facility]


   Last Admin: 09/17/18 10:30 Dose:  10 ml


Sodium Chloride (Sodium Chloride Flush Syringe 10 Ml)  10 ml IV PRN PRN


   PRN Reason: LINE FLUSH











Review of Systems


All systems: negative (as per hpi)





Physical Examination





- Physical Exam


Narrative exam: 





General appearance: Alert in NAD, conversant 


Eyes: anicteric sclerae, moist conjunctivae; no lid-lag; PERRLA 


HENT: Atraumatic; oropharynx clear  


Neck: Trachea midline; supple, no thyromegaly or lymphadenopathy 


Lungs: CTA, with normal respiratory effort and no intercostal retractions 


CV: RRR, 


Abdomen: Soft, non-tender; no masses or hepatosplenomegaly


Extremities: No peripheral edema or extremity lymphadenopathy


Skin: rigth groin wound with purulence, mild, no erythema


Psych: Appropriate affect, alert and oriented to person, place and time. Neuro: 

alert and oriented x 3. Moving all extermities


Lines: No CVL / PICC











- Constitutional


Vitals: 


 Vital Signs











Temp Pulse Resp BP Pulse Ox


 


 98.4 F   63   20   162/64   97 


 


 09/17/18 11:43  09/17/18 11:43  09/17/18 11:43  09/17/18 11:43  09/17/18 11:43








 Temperature -Last 24 Hours











Temperature                    98.4 F


 


Temperature                    98.9 F


 


Temperature                    98.8 F


 


Temperature                    98.8 F


 


Temperature                    97.9 F


 


Temperature                    97.8 F

















Results





- Labs


CBC & Chem 7: 


 09/16/18 03:33





 09/17/18 05:04


Labs: 


 Abnormal lab results











  09/16/18 09/16/18 09/17/18 Range/Units





  12:31 21:28 05:04 


 


BUN    20 H  (7-17)  mg/dL


 


Creatinine    1.3 H  (0.7-1.2)  mg/dL


 


Glucose    116 H  ()  mg/dL


 


POC Glucose  201 H  178 H   ()  














  09/17/18 09/17/18 Range/Units





  06:28 11:16 


 


BUN    (7-17)  mg/dL


 


Creatinine    (0.7-1.2)  mg/dL


 


Glucose    ()  mg/dL


 


POC Glucose  133 H  249 H  ()  














Assessment and Plan





Assessment: 


1)  Right groin boil/abscess - better


   -I&D 4 days before admission, no cultures sent 


2) CHF


3) HTN 


4) Penicillin allergy 


 


Plan:


-stop vanco and meroepenem


-ok to do doxycycline 100 mg po q12h total 7 days





Discussed with Dr Chandler


 


Thank you for your consultation, will follow up with you. 





Eulalia Morrison MD


Infectious Diseases Specialist


[facility] ([facility]


M [phone]


O [phone]